# Patient Record
Sex: FEMALE | Race: ASIAN | NOT HISPANIC OR LATINO | URBAN - METROPOLITAN AREA
[De-identification: names, ages, dates, MRNs, and addresses within clinical notes are randomized per-mention and may not be internally consistent; named-entity substitution may affect disease eponyms.]

---

## 2022-06-19 ENCOUNTER — INPATIENT (INPATIENT)
Facility: HOSPITAL | Age: 47
LOS: 3 days | Discharge: HOME | End: 2022-06-23
Attending: INTERNAL MEDICINE | Admitting: INTERNAL MEDICINE
Payer: MEDICAID

## 2022-06-19 VITALS
WEIGHT: 169.98 LBS | SYSTOLIC BLOOD PRESSURE: 118 MMHG | OXYGEN SATURATION: 99 % | RESPIRATION RATE: 18 BRPM | HEART RATE: 92 BPM | TEMPERATURE: 97 F | DIASTOLIC BLOOD PRESSURE: 71 MMHG

## 2022-06-19 LAB
ALBUMIN SERPL ELPH-MCNC: 4.3 G/DL — SIGNIFICANT CHANGE UP (ref 3.5–5.2)
ALP SERPL-CCNC: 85 U/L — SIGNIFICANT CHANGE UP (ref 30–115)
ALT FLD-CCNC: 9 U/L — SIGNIFICANT CHANGE UP (ref 0–41)
ANION GAP SERPL CALC-SCNC: 13 MMOL/L — SIGNIFICANT CHANGE UP (ref 7–14)
APTT BLD: 31.9 SEC — SIGNIFICANT CHANGE UP (ref 27–39.2)
AST SERPL-CCNC: 16 U/L — SIGNIFICANT CHANGE UP (ref 0–41)
BASOPHILS # BLD AUTO: 0.03 K/UL — SIGNIFICANT CHANGE UP (ref 0–0.2)
BASOPHILS NFR BLD AUTO: 0.2 % — SIGNIFICANT CHANGE UP (ref 0–1)
BILIRUB SERPL-MCNC: 1.9 MG/DL — HIGH (ref 0.2–1.2)
BUN SERPL-MCNC: 7 MG/DL — LOW (ref 10–20)
CALCIUM SERPL-MCNC: 8.9 MG/DL — SIGNIFICANT CHANGE UP (ref 8.5–10.1)
CHLORIDE SERPL-SCNC: 99 MMOL/L — SIGNIFICANT CHANGE UP (ref 98–110)
CO2 SERPL-SCNC: 24 MMOL/L — SIGNIFICANT CHANGE UP (ref 17–32)
CREAT SERPL-MCNC: 0.7 MG/DL — SIGNIFICANT CHANGE UP (ref 0.7–1.5)
EGFR: 107 ML/MIN/1.73M2 — SIGNIFICANT CHANGE UP
EOSINOPHIL # BLD AUTO: 0 K/UL — SIGNIFICANT CHANGE UP (ref 0–0.7)
EOSINOPHIL NFR BLD AUTO: 0 % — SIGNIFICANT CHANGE UP (ref 0–8)
GLUCOSE SERPL-MCNC: 110 MG/DL — HIGH (ref 70–99)
HCT VFR BLD CALC: 34 % — LOW (ref 37–47)
HGB BLD-MCNC: 11.7 G/DL — LOW (ref 12–16)
IMM GRANULOCYTES NFR BLD AUTO: 0.3 % — SIGNIFICANT CHANGE UP (ref 0.1–0.3)
INR BLD: 1.18 RATIO — SIGNIFICANT CHANGE UP (ref 0.65–1.3)
LYMPHOCYTES # BLD AUTO: 1.4 K/UL — SIGNIFICANT CHANGE UP (ref 1.2–3.4)
LYMPHOCYTES # BLD AUTO: 9.5 % — LOW (ref 20.5–51.1)
MCHC RBC-ENTMCNC: 32.5 PG — HIGH (ref 27–31)
MCHC RBC-ENTMCNC: 34.4 G/DL — SIGNIFICANT CHANGE UP (ref 32–37)
MCV RBC AUTO: 94.4 FL — SIGNIFICANT CHANGE UP (ref 81–99)
MONOCYTES # BLD AUTO: 1.08 K/UL — HIGH (ref 0.1–0.6)
MONOCYTES NFR BLD AUTO: 7.4 % — SIGNIFICANT CHANGE UP (ref 1.7–9.3)
NEUTROPHILS # BLD AUTO: 12.13 K/UL — HIGH (ref 1.4–6.5)
NEUTROPHILS NFR BLD AUTO: 82.6 % — HIGH (ref 42.2–75.2)
NRBC # BLD: 0 /100 WBCS — SIGNIFICANT CHANGE UP (ref 0–0)
PLATELET # BLD AUTO: 204 K/UL — SIGNIFICANT CHANGE UP (ref 130–400)
POTASSIUM SERPL-MCNC: 4.1 MMOL/L — SIGNIFICANT CHANGE UP (ref 3.5–5)
POTASSIUM SERPL-SCNC: 4.1 MMOL/L — SIGNIFICANT CHANGE UP (ref 3.5–5)
PROT SERPL-MCNC: 7 G/DL — SIGNIFICANT CHANGE UP (ref 6–8)
PROTHROM AB SERPL-ACNC: 13.5 SEC — HIGH (ref 9.95–12.87)
RBC # BLD: 3.6 M/UL — LOW (ref 4.2–5.4)
RBC # FLD: 12.2 % — SIGNIFICANT CHANGE UP (ref 11.5–14.5)
SARS-COV-2 RNA SPEC QL NAA+PROBE: DETECTED
SODIUM SERPL-SCNC: 136 MMOL/L — SIGNIFICANT CHANGE UP (ref 135–146)
WBC # BLD: 14.69 K/UL — HIGH (ref 4.8–10.8)
WBC # FLD AUTO: 14.69 K/UL — HIGH (ref 4.8–10.8)

## 2022-06-19 PROCEDURE — 70491 CT SOFT TISSUE NECK W/DYE: CPT | Mod: 26,MA

## 2022-06-19 PROCEDURE — 99291 CRITICAL CARE FIRST HOUR: CPT

## 2022-06-19 PROCEDURE — 99222 1ST HOSP IP/OBS MODERATE 55: CPT | Mod: 25

## 2022-06-19 RX ORDER — DEXAMETHASONE 0.5 MG/5ML
10 ELIXIR ORAL ONCE
Refills: 0 | Status: COMPLETED | OUTPATIENT
Start: 2022-06-19 | End: 2022-06-19

## 2022-06-19 RX ORDER — AMPICILLIN SODIUM AND SULBACTAM SODIUM 250; 125 MG/ML; MG/ML
INJECTION, POWDER, FOR SUSPENSION INTRAMUSCULAR; INTRAVENOUS
Refills: 0 | Status: DISCONTINUED | OUTPATIENT
Start: 2022-06-19 | End: 2022-06-23

## 2022-06-19 RX ORDER — AMPICILLIN SODIUM AND SULBACTAM SODIUM 250; 125 MG/ML; MG/ML
3 INJECTION, POWDER, FOR SUSPENSION INTRAMUSCULAR; INTRAVENOUS EVERY 6 HOURS
Refills: 0 | Status: DISCONTINUED | OUTPATIENT
Start: 2022-06-19 | End: 2022-06-23

## 2022-06-19 RX ORDER — DEXAMETHASONE 0.5 MG/5ML
6 ELIXIR ORAL DAILY
Refills: 0 | Status: DISCONTINUED | OUTPATIENT
Start: 2022-06-19 | End: 2022-06-20

## 2022-06-19 RX ORDER — AMPICILLIN SODIUM AND SULBACTAM SODIUM 250; 125 MG/ML; MG/ML
3 INJECTION, POWDER, FOR SUSPENSION INTRAMUSCULAR; INTRAVENOUS ONCE
Refills: 0 | Status: COMPLETED | OUTPATIENT
Start: 2022-06-19 | End: 2022-06-19

## 2022-06-19 RX ORDER — SODIUM CHLORIDE 9 MG/ML
2400 INJECTION, SOLUTION INTRAVENOUS ONCE
Refills: 0 | Status: COMPLETED | OUTPATIENT
Start: 2022-06-19 | End: 2022-06-19

## 2022-06-19 RX ADMIN — SODIUM CHLORIDE 2400 MILLILITER(S): 9 INJECTION, SOLUTION INTRAVENOUS at 10:40

## 2022-06-19 RX ADMIN — Medication 10 MILLIGRAM(S): at 11:00

## 2022-06-19 RX ADMIN — AMPICILLIN SODIUM AND SULBACTAM SODIUM 200 GRAM(S): 250; 125 INJECTION, POWDER, FOR SUSPENSION INTRAMUSCULAR; INTRAVENOUS at 12:04

## 2022-06-19 RX ADMIN — Medication 102 MILLIGRAM(S): at 10:40

## 2022-06-19 RX ADMIN — AMPICILLIN SODIUM AND SULBACTAM SODIUM 200 GRAM(S): 250; 125 INJECTION, POWDER, FOR SUSPENSION INTRAMUSCULAR; INTRAVENOUS at 18:48

## 2022-06-19 RX ADMIN — SODIUM CHLORIDE 2400 MILLILITER(S): 9 INJECTION, SOLUTION INTRAVENOUS at 11:40

## 2022-06-19 NOTE — H&P ADULT - NSHPPHYSICALEXAM_GEN_ALL_CORE
Physical Examination:    General: No acute distress.  Alert, oriented, interactive, nonfocal    HEENT: Pupils equal, reactive to light.  Symmetric.    PULM: Clear to auscultation bilaterally, no significant sputum production    CVS: Regular rate and rhythm, no murmurs, rubs, or gallops    ABD: Soft, nondistended, nontender, normoactive bowel sounds, no masses    EXT: No edema, nontender    SKIN: Warm and well perfused, no rashes noted.

## 2022-06-19 NOTE — H&P ADULT - ASSESSMENT
IMPRESSION:  Odynophagia & dysphagia   ?Peritonsillar abscess  R submandibular lymphadenopathy    Mildly enlarged and edematous right submandibular gland.  COVID-19 PNA         PLAN:    CNS: No depressants     HEENT: Oral care, f/u ENT, c/w decadron, airway monitoring, low threshold for intubation, f/u S&S    PULMONARY: HOB elevated at 45 degrees aspiration precaution, Monitor pulse ox, keep SpO2 >92%, supplemental O2 as needed.     CARDIOVASCULAR: off pressors, continue IVF, add D5 if NPO. Monitor vitals     GI: GI prophylaxis.  Feeding as tolerated, f/u speech and swallow,     RENAL: Monitor electrolytes, correct as needed.     INFECTIOUS DISEASE: Trend WBC, c/w Unasyn, f/u C & S, f/u ENT    HEMATOLOGICAL:  DVT prophylaxis. Trend WBC    ENDOCRINE:  Follow up FS.  Insulin protocol if needed.    MUSCULOSKELETAL: Bed rest

## 2022-06-19 NOTE — CONSULT NOTE ADULT - NS ATTEND AMEND GEN_ALL_CORE FT
Patient seen and examined at bedside.    I reviewed, interpreted and discussed CT images. Peritonsillar abscess.  possible laryngeal asymmetry.    Continue abx. switch to clindamycin in 24h if no improvement.    I reviewed and interpreted videoendoscopic pictures. No airway compromise.    Will follow.

## 2022-06-19 NOTE — H&P ADULT - NSHPLABSRESULTS_GEN_ALL_CORE
ICU Vital Signs Last 24 Hrs  T(C): 36.1 (19 Jun 2022 08:28), Max: 36.1 (19 Jun 2022 08:28)  T(F): 97 (19 Jun 2022 08:28), Max: 97 (19 Jun 2022 08:28)  HR: 92 (19 Jun 2022 08:28) (92 - 92)  BP: 118/71 (19 Jun 2022 08:28) (118/71 - 118/71)  BP(mean): --  ABP: --  ABP(mean): --  RR: 18 (19 Jun 2022 08:28) (18 - 18)  SpO2: 99% (19 Jun 2022 08:28) (99% - 99%)        I&O's Detail        LABS:                        11.7   14.69 )-----------( 204      ( 19 Jun 2022 10:24 )             34.0     19 Jun 2022 10:24    136    |  99     |  7      ----------------------------<  110    4.1     |  24     |  0.7      Ca    8.9        19 Jun 2022 10:24    TPro  7.0    /  Alb  4.3    /  TBili  1.9    /  DBili  x      /  AST  16     /  ALT  9      /  AlkPhos  85     19 Jun 2022 10:24  Amylase x     lipase x              CAPILLARY BLOOD GLUCOSE        PT/INR - ( 19 Jun 2022 10:24 )   PT: 13.50 sec;   INR: 1.18 ratio         PTT - ( 19 Jun 2022 10:24 )  PTT:31.9 sec    Culture        MEDICATIONS  (STANDING):    MEDICATIONS  (PRN):        RADIOLOGY: ***     CXR:  TLC:  OG:  ET tube:          ECHO:

## 2022-06-19 NOTE — ED ADULT TRIAGE NOTE - CHIEF COMPLAINT QUOTE
Patient complaining of sore throat x4 days. Denies any difficulty swallowing, but pain. Speaking in full sentences in triage, no drooling.

## 2022-06-19 NOTE — ED PROVIDER NOTE - NS ED ROS FT
Constitutional: No fevers.   Eyes:  No visual changes, eye pain or discharge.  ENMT:  +odynophagia  Cardiac:  No chest pain, SOB or edema.   Respiratory:  No cough or respiratory distress. No hemoptysis. No history of asthma or RAD.  GI:  No nausea, vomiting, diarrhea or abdominal pain.  :  No dysuria, frequency or burning.  MS:  No myalgia, muscle weakness, joint pain or back pain.  Neuro:  No headache or weakness.  No LOC.  Skin:  No skin rash.   Endocrine: No history of thyroid disease or diabetes.

## 2022-06-19 NOTE — H&P ADULT - HISTORY OF PRESENT ILLNESS
47-year-old female with no past medical history presenting with sore throat for the last 4 days.  Patient states that she has had progressive odynophagia for the last 4 days that has now progressed to a hoarse voice, chills, edema to the right submandibular region.  Patient Dors is pain with range of motion of her neck.  Went to her primary care physician yesterday gave her azithromycin.  Is able to tolerate p.o.     47-year-old Mandarin speaking female with no past medical history presented with sore throat for the last 4 days.  Patient states that she has had progressive odynophagia and dysphagia with mild drooling for the last 4 days that has now progressed to a hoarse voice, chills, edema to the right submandibular region.  Patient also noticed pain with range of motion of her neck.  Went to her primary care physician yesterday gave her azithromycin.  Is able to tolerate p.o. denies fever, SOB.   On admission: afebrile, hemodynamically stable, saturating well on RA, COVID + (was unaware), WBC 14.69, Hg 11.7, CT neck showed: Right tonsillar enlargement with a peritonsillar fluid collection   measuring 0.7 x 0.6 cm. Asymmetric soft tissue thickening along the right oropharynx/hypopharynx  extending towards larynx with soft tissue mass within the right laryngeal  ventricle measuring approximately 2.3 x 1.2 cm. Recommend repeat  examination after appropriate antimicrobial treatment to document  resolution. If finding persists, recommend visual inspection for further  evaluation.

## 2022-06-19 NOTE — ED ADULT NURSE NOTE - SUICIDE SCREENING QUESTION 3
Patient repeatedly told RN that she would not want a tracheostomy or feeding tube and indicated that she would like the tube removed. RN provided education to patient that she was improving somewhat and asked if patient was uncomfortable; patient indicated that she was ok and that she could wait till Friday. Support offered to staff as well. Palliative care team to continue to follow; please contact with any concerns.     Goals/Plan of care  Education/support to staff  Education/support to family  Education/support to patient  Providing support for coping/adaptation/distress of family  Providing support for coping/adaptation/distress of patient  Decision making regarding life prolonging treatment    Electronically signed by   Malina Xie RN  Palliative Care Team  on 11/21/2018 at 4:10 PM No

## 2022-06-19 NOTE — ED PROVIDER NOTE - OBJECTIVE STATEMENT
47-year-old female with no past medical history presenting with sore throat for the last 4 days.  Patient states that she has had progressive odynophagia for the last 4 days that has now progressed to a hoarse voice, chills, edema to the right submandibular region.  Patient Dors is pain with range of motion of her neck.  Went to her primary care physician yesterday gave her azithromycin.  Is able to tolerate p.o.

## 2022-06-19 NOTE — ED PROVIDER NOTE - CLINICAL SUMMARY MEDICAL DECISION MAKING FREE TEXT BOX
patient with sore throat.   Noted to have PTA with right submandibular edema.   ENT consulted.  abx, decadron given.   admitted to ICU for airway monitoring.  Stable at this time- no indication for intubation at this time.

## 2022-06-19 NOTE — ED ADULT NURSE NOTE - LANGUAGE ASSISTANCE NEEDED
provided in urgi before transferred to main/No-Patient/Caregiver offered and refused free interpretation services.

## 2022-06-19 NOTE — ED PROVIDER NOTE - PHYSICAL EXAMINATION
CONSTITUTIONAL: Well-developed; well-nourished; in no acute distress. muffled voice.  SKIN: warm, dry.  HEAD: Normocephalic; atraumatic.  EYES: PERRL, EOMI, no conjunctival erythema  ENMT: Large right sided PTA. floor of mouth is soft, nontender, not raised.   NECK: Supple; non tender. right submandibular edema/induration.   CARD: S1, S2 normal; no murmurs, gallops, or rubs. Regular rate and rhythm.   RESP: No wheezes, rales or rhonchi.  ABD: soft ntnd.  EXT: Normal ROM.  No clubbing, cyanosis or edema.   NEURO: Alert, oriented, grossly unremarkable.  PSYCH: Cooperative, appropriate.

## 2022-06-20 LAB
ALBUMIN SERPL ELPH-MCNC: 3.8 G/DL — SIGNIFICANT CHANGE UP (ref 3.5–5.2)
ALP SERPL-CCNC: 77 U/L — SIGNIFICANT CHANGE UP (ref 30–115)
ALT FLD-CCNC: 9 U/L — SIGNIFICANT CHANGE UP (ref 0–41)
ANION GAP SERPL CALC-SCNC: 13 MMOL/L — SIGNIFICANT CHANGE UP (ref 7–14)
AST SERPL-CCNC: 11 U/L — SIGNIFICANT CHANGE UP (ref 0–41)
BILIRUB SERPL-MCNC: 1 MG/DL — SIGNIFICANT CHANGE UP (ref 0.2–1.2)
BUN SERPL-MCNC: 12 MG/DL — SIGNIFICANT CHANGE UP (ref 10–20)
CALCIUM SERPL-MCNC: 9 MG/DL — SIGNIFICANT CHANGE UP (ref 8.5–10.1)
CHLORIDE SERPL-SCNC: 100 MMOL/L — SIGNIFICANT CHANGE UP (ref 98–110)
CO2 SERPL-SCNC: 27 MMOL/L — SIGNIFICANT CHANGE UP (ref 17–32)
CREAT SERPL-MCNC: 0.6 MG/DL — LOW (ref 0.7–1.5)
EGFR: 111 ML/MIN/1.73M2 — SIGNIFICANT CHANGE UP
GLUCOSE SERPL-MCNC: 97 MG/DL — SIGNIFICANT CHANGE UP (ref 70–99)
HCT VFR BLD CALC: 35 % — LOW (ref 37–47)
HGB BLD-MCNC: 12 G/DL — SIGNIFICANT CHANGE UP (ref 12–16)
MAGNESIUM SERPL-MCNC: 2.1 MG/DL — SIGNIFICANT CHANGE UP (ref 1.8–2.4)
MCHC RBC-ENTMCNC: 32.9 PG — HIGH (ref 27–31)
MCHC RBC-ENTMCNC: 34.3 G/DL — SIGNIFICANT CHANGE UP (ref 32–37)
MCV RBC AUTO: 95.9 FL — SIGNIFICANT CHANGE UP (ref 81–99)
NRBC # BLD: 0 /100 WBCS — SIGNIFICANT CHANGE UP (ref 0–0)
PLATELET # BLD AUTO: 234 K/UL — SIGNIFICANT CHANGE UP (ref 130–400)
POTASSIUM SERPL-MCNC: 3.9 MMOL/L — SIGNIFICANT CHANGE UP (ref 3.5–5)
POTASSIUM SERPL-SCNC: 3.9 MMOL/L — SIGNIFICANT CHANGE UP (ref 3.5–5)
PROT SERPL-MCNC: 6.5 G/DL — SIGNIFICANT CHANGE UP (ref 6–8)
RBC # BLD: 3.65 M/UL — LOW (ref 4.2–5.4)
RBC # FLD: 12.3 % — SIGNIFICANT CHANGE UP (ref 11.5–14.5)
SODIUM SERPL-SCNC: 140 MMOL/L — SIGNIFICANT CHANGE UP (ref 135–146)
WBC # BLD: 15.46 K/UL — HIGH (ref 4.8–10.8)
WBC # FLD AUTO: 15.46 K/UL — HIGH (ref 4.8–10.8)

## 2022-06-20 PROCEDURE — 99222 1ST HOSP IP/OBS MODERATE 55: CPT

## 2022-06-20 PROCEDURE — 99024 POSTOP FOLLOW-UP VISIT: CPT

## 2022-06-20 PROCEDURE — 31575 DIAGNOSTIC LARYNGOSCOPY: CPT

## 2022-06-20 PROCEDURE — 71045 X-RAY EXAM CHEST 1 VIEW: CPT | Mod: 26

## 2022-06-20 RX ORDER — INFLUENZA VIRUS VACCINE 15; 15; 15; 15 UG/.5ML; UG/.5ML; UG/.5ML; UG/.5ML
0.5 SUSPENSION INTRAMUSCULAR ONCE
Refills: 0 | Status: DISCONTINUED | OUTPATIENT
Start: 2022-06-20 | End: 2022-06-23

## 2022-06-20 RX ADMIN — AMPICILLIN SODIUM AND SULBACTAM SODIUM 200 GRAM(S): 250; 125 INJECTION, POWDER, FOR SUSPENSION INTRAMUSCULAR; INTRAVENOUS at 20:54

## 2022-06-20 RX ADMIN — AMPICILLIN SODIUM AND SULBACTAM SODIUM 200 GRAM(S): 250; 125 INJECTION, POWDER, FOR SUSPENSION INTRAMUSCULAR; INTRAVENOUS at 16:11

## 2022-06-20 RX ADMIN — Medication 6 MILLIGRAM(S): at 06:27

## 2022-06-20 RX ADMIN — AMPICILLIN SODIUM AND SULBACTAM SODIUM 200 GRAM(S): 250; 125 INJECTION, POWDER, FOR SUSPENSION INTRAMUSCULAR; INTRAVENOUS at 03:06

## 2022-06-20 RX ADMIN — AMPICILLIN SODIUM AND SULBACTAM SODIUM 200 GRAM(S): 250; 125 INJECTION, POWDER, FOR SUSPENSION INTRAMUSCULAR; INTRAVENOUS at 10:45

## 2022-06-20 NOTE — PATIENT PROFILE ADULT - FALL HARM RISK - PATIENT NEEDS ASSISTANCE
I just finished contacting the patient (sean) she said she can not do video it arun not work for her. she does not want to cancel appointment. She said she wants to see doshamarl she was screaming on the phone. She is not feeling well and cant breath well. So she said she was not going to cancel. Tried to reschedule but does not want to cancel please call the patient.   Standing/Walking/Toileting

## 2022-06-20 NOTE — PROGRESS NOTE ADULT - SUBJECTIVE AND OBJECTIVE BOX
ENT DAILY PROGRESS NOTE    Pt is a 47y Female      REVIEW OF SYSTEMS   [x] A ten-point review of systems was otherwise negative except as noted.  [ ] Due to altered mental status/intubation, subjective information were not able to be obtained from patient. History was obtained, to the extent possible, from review of the chart and collateral sources of information.    Allergies    No Known Allergies    Intolerances        MEDICATIONS:  ampicillin/sulbactam  IVPB      ampicillin/sulbactam  IVPB 3 Gram(s) IV Intermittent every 6 hours  influenza   Vaccine 0.5 milliLiter(s) IntraMuscular once      Vital Signs Last 24 Hrs  T(C): 35.9 (20 Jun 2022 13:33), Max: 36.9 (20 Jun 2022 06:30)  T(F): 96.6 (20 Jun 2022 13:33), Max: 98.4 (20 Jun 2022 06:30)  HR: 64 (20 Jun 2022 13:33) (64 - 87)  BP: 131/75 (20 Jun 2022 13:33) (111/56 - 131/75)  BP(mean): --  RR: 18 (20 Jun 2022 13:33) (16 - 18)  SpO2: 100% (20 Jun 2022 13:33) (96% - 100%)        PHYSICAL EXAM:    GEN: Well-developed, well-nourished. NAD, awake and alert. No drooling or pooling of secretions. No stridor or stertor. Good vocal quality, no hoarseness.   SKIN: Good color, non diaphoretic  HEENT: NC/AT; Oral mucosa pink and moist. No erythema or edema noted to buccal mucosa, tongue, FOM, uvula or posterior oropharynx. Uvula midline  NECK:  Trachea midline. Neck supple, no TTP to B/L lateral neck, no cervical LAD.  RESP: No dyspnea, non-labored breathing. No use of accessory muscles.  CARDIO: +S1/S2  ABDO: Soft, NT.  EXT: STAPLETON x 4    LABS:  CBC-                        12.0   15.46 )-----------( 234      ( 20 Jun 2022 07:21 )             35.0     BMP/CMP-  20 Jun 2022 07:21    140    |  100    |  12     ----------------------------<  97     3.9     |  27     |  0.6      Ca    9.0        20 Jun 2022 07:21  Mg     2.1       20 Jun 2022 07:21    TPro  6.5    /  Alb  3.8    /  TBili  1.0    /  DBili  x      /  AST  11     /  ALT  9      /  AlkPhos  77     20 Jun 2022 07:21    Coagulation Studies-  PT/INR - ( 19 Jun 2022 10:24 )   PT: 13.50 sec;   INR: 1.18 ratio         PTT - ( 19 Jun 2022 10:24 )  PTT:31.9 sec  Endocrine Panel-  Calcium, Total Serum: 9.0 mg/dL (06-20 @ 07:21)              RADIOLOGY & ADDITIONAL STUDIES:   ENT DAILY PROGRESS NOTE    Pt is a 47y Female Mandarin speaking with no significant PMH a/w Right PTA s/p bedside FNA by ENT. Patient seen and examined at bedside. Patient reports mild improvement of sore throat. Patient reports dyphagia to PO solids. Denies any fever, chills, SOB/difficulty breathing, decrease tolerance of own secretions, odynphagia. Denies any metallic taste in her mouth. No acute overnight events.       REVIEW OF SYSTEMS   [x] A ten-point review of systems was otherwise negative except as noted.    Allergies    No Known Allergies    Intolerances        MEDICATIONS:  ampicillin/sulbactam  IVPB      ampicillin/sulbactam  IVPB 3 Gram(s) IV Intermittent every 6 hours  influenza   Vaccine 0.5 milliLiter(s) IntraMuscular once      Vital Signs Last 24 Hrs  T(C): 35.9 (20 Jun 2022 13:33), Max: 36.9 (20 Jun 2022 06:30)  T(F): 96.6 (20 Jun 2022 13:33), Max: 98.4 (20 Jun 2022 06:30)  HR: 64 (20 Jun 2022 13:33) (64 - 87)  BP: 131/75 (20 Jun 2022 13:33) (111/56 - 131/75)  RR: 18 (20 Jun 2022 13:33) (16 - 18)  SpO2: 100% (20 Jun 2022 13:33) (96% - 100%)        PHYSICAL EXAM:    GEN: Well-developed, well-nourished. NAD, awake and alert. No drooling or pooling of secretions. No stridor or stertor. Good vocal quality, no hoarseness.   SKIN: Good color, non diaphoretic  HEENT: NC/AT; Oral mucosa pink and moist. No erythema or edema noted to buccal mucosa, tongue, FOM, uvula. Uvula midline. + edematous / erythematous RIGHT peritonsillar region, no evidence of purulent drainage or active bleeding, +TTP   NECK:  Trachea midline. Neck supple, no TTP to B/L lateral neck, no cervical LAD.  RESP: No dyspnea, non-labored breathing. No use of accessory muscles.  CARDIO: +S1/S2  ABDO: Soft, NT.  EXT: STAPLETON x 4    LABS:  CBC-                        12.0   15.46 )-----------( 234      ( 20 Jun 2022 07:21 )             35.0     BMP/CMP-  20 Jun 2022 07:21    140    |  100    |  12     ----------------------------<  97     3.9     |  27     |  0.6      Ca    9.0        20 Jun 2022 07:21  Mg     2.1       20 Jun 2022 07:21    TPro  6.5    /  Alb  3.8    /  TBili  1.0    /  DBili  x      /  AST  11     /  ALT  9      /  AlkPhos  77     20 Jun 2022 07:21    Coagulation Studies-  PT/INR - ( 19 Jun 2022 10:24 )   PT: 13.50 sec;   INR: 1.18 ratio         PTT - ( 19 Jun 2022 10:24 )  PTT:31.9 sec  Endocrine Panel-  Calcium, Total Serum: 9.0 mg/dL (06-20 @ 07:21)              RADIOLOGY & ADDITIONAL STUDIES:

## 2022-06-20 NOTE — PATIENT PROFILE ADULT - FALL HARM RISK - HARM RISK INTERVENTIONS

## 2022-06-20 NOTE — SWALLOW BEDSIDE ASSESSMENT ADULT - SLP PERTINENT HISTORY OF CURRENT PROBLEM
47-year-old Mandarin speaking female with no past medical history presented with sore throat for the last 4 days.  Patient states that she has had progressive odynophagia and dysphagia with mild drooling for the last 4 days that has now progressed to a hoarse voice, chills, edema to the right submandibular region.  Patient also noticed pain with range of motion of her neck.  Went to her primary care physician yesterday gave her azithromycin.  Is able to tolerate p.o. denies fever, SOB.

## 2022-06-21 LAB
ALBUMIN SERPL ELPH-MCNC: 3.7 G/DL — SIGNIFICANT CHANGE UP (ref 3.5–5.2)
ALP SERPL-CCNC: 81 U/L — SIGNIFICANT CHANGE UP (ref 30–115)
ALT FLD-CCNC: 13 U/L — SIGNIFICANT CHANGE UP (ref 0–41)
ANION GAP SERPL CALC-SCNC: 12 MMOL/L — SIGNIFICANT CHANGE UP (ref 7–14)
AST SERPL-CCNC: 23 U/L — SIGNIFICANT CHANGE UP (ref 0–41)
BASOPHILS # BLD AUTO: 0.02 K/UL — SIGNIFICANT CHANGE UP (ref 0–0.2)
BASOPHILS NFR BLD AUTO: 0.1 % — SIGNIFICANT CHANGE UP (ref 0–1)
BILIRUB SERPL-MCNC: 1.3 MG/DL — HIGH (ref 0.2–1.2)
BUN SERPL-MCNC: 9 MG/DL — LOW (ref 10–20)
CALCIUM SERPL-MCNC: 8.7 MG/DL — SIGNIFICANT CHANGE UP (ref 8.5–10.1)
CHLORIDE SERPL-SCNC: 100 MMOL/L — SIGNIFICANT CHANGE UP (ref 98–110)
CO2 SERPL-SCNC: 25 MMOL/L — SIGNIFICANT CHANGE UP (ref 17–32)
CREAT SERPL-MCNC: 0.6 MG/DL — LOW (ref 0.7–1.5)
EGFR: 111 ML/MIN/1.73M2 — SIGNIFICANT CHANGE UP
EOSINOPHIL # BLD AUTO: 0.01 K/UL — SIGNIFICANT CHANGE UP (ref 0–0.7)
EOSINOPHIL NFR BLD AUTO: 0.1 % — SIGNIFICANT CHANGE UP (ref 0–8)
GLUCOSE SERPL-MCNC: 99 MG/DL — SIGNIFICANT CHANGE UP (ref 70–99)
HCT VFR BLD CALC: 35.3 % — LOW (ref 37–47)
HGB BLD-MCNC: 12.1 G/DL — SIGNIFICANT CHANGE UP (ref 12–16)
IMM GRANULOCYTES NFR BLD AUTO: 0.6 % — HIGH (ref 0.1–0.3)
LYMPHOCYTES # BLD AUTO: 1.57 K/UL — SIGNIFICANT CHANGE UP (ref 1.2–3.4)
LYMPHOCYTES # BLD AUTO: 11.4 % — LOW (ref 20.5–51.1)
MAGNESIUM SERPL-MCNC: 2.1 MG/DL — SIGNIFICANT CHANGE UP (ref 1.8–2.4)
MCHC RBC-ENTMCNC: 32.6 PG — HIGH (ref 27–31)
MCHC RBC-ENTMCNC: 34.3 G/DL — SIGNIFICANT CHANGE UP (ref 32–37)
MCV RBC AUTO: 95.1 FL — SIGNIFICANT CHANGE UP (ref 81–99)
MONOCYTES # BLD AUTO: 0.98 K/UL — HIGH (ref 0.1–0.6)
MONOCYTES NFR BLD AUTO: 7.1 % — SIGNIFICANT CHANGE UP (ref 1.7–9.3)
MRSA PCR RESULT.: NEGATIVE — SIGNIFICANT CHANGE UP
NEUTROPHILS # BLD AUTO: 11.11 K/UL — HIGH (ref 1.4–6.5)
NEUTROPHILS NFR BLD AUTO: 80.7 % — HIGH (ref 42.2–75.2)
NRBC # BLD: 0 /100 WBCS — SIGNIFICANT CHANGE UP (ref 0–0)
PHOSPHATE SERPL-MCNC: 3.3 MG/DL — SIGNIFICANT CHANGE UP (ref 2.1–4.9)
PLATELET # BLD AUTO: 253 K/UL — SIGNIFICANT CHANGE UP (ref 130–400)
POTASSIUM SERPL-MCNC: 4.1 MMOL/L — SIGNIFICANT CHANGE UP (ref 3.5–5)
POTASSIUM SERPL-SCNC: 4.1 MMOL/L — SIGNIFICANT CHANGE UP (ref 3.5–5)
PROT SERPL-MCNC: 6.5 G/DL — SIGNIFICANT CHANGE UP (ref 6–8)
RBC # BLD: 3.71 M/UL — LOW (ref 4.2–5.4)
RBC # FLD: 12.4 % — SIGNIFICANT CHANGE UP (ref 11.5–14.5)
SODIUM SERPL-SCNC: 137 MMOL/L — SIGNIFICANT CHANGE UP (ref 135–146)
WBC # BLD: 13.77 K/UL — HIGH (ref 4.8–10.8)
WBC # FLD AUTO: 13.77 K/UL — HIGH (ref 4.8–10.8)

## 2022-06-21 PROCEDURE — 99233 SBSQ HOSP IP/OBS HIGH 50: CPT | Mod: 25

## 2022-06-21 PROCEDURE — 42700 I&D ABSCESS PERITONSILLAR: CPT

## 2022-06-21 PROCEDURE — 99233 SBSQ HOSP IP/OBS HIGH 50: CPT

## 2022-06-21 PROCEDURE — 10160 PNXR ASPIR ABSC HMTMA BULLA: CPT

## 2022-06-21 RX ORDER — BENZOCAINE 10 %
1 GEL (GRAM) MUCOUS MEMBRANE ONCE
Refills: 0 | Status: COMPLETED | OUTPATIENT
Start: 2022-06-21 | End: 2022-06-21

## 2022-06-21 RX ORDER — ENOXAPARIN SODIUM 100 MG/ML
40 INJECTION SUBCUTANEOUS ONCE
Refills: 0 | Status: COMPLETED | OUTPATIENT
Start: 2022-06-21 | End: 2022-06-21

## 2022-06-21 RX ORDER — ACETAMINOPHEN 500 MG
775 TABLET ORAL EVERY 6 HOURS
Refills: 0 | Status: DISCONTINUED | OUTPATIENT
Start: 2022-06-21 | End: 2022-06-23

## 2022-06-21 RX ORDER — LANOLIN ALCOHOL/MO/W.PET/CERES
5 CREAM (GRAM) TOPICAL AT BEDTIME
Refills: 0 | Status: DISCONTINUED | OUTPATIENT
Start: 2022-06-21 | End: 2022-06-23

## 2022-06-21 RX ORDER — DEXAMETHASONE 0.5 MG/5ML
6 ELIXIR ORAL DAILY
Refills: 0 | Status: DISCONTINUED | OUTPATIENT
Start: 2022-06-21 | End: 2022-06-23

## 2022-06-21 RX ORDER — OXYCODONE AND ACETAMINOPHEN 5; 325 MG/1; MG/1
1 TABLET ORAL ONCE
Refills: 0 | Status: DISCONTINUED | OUTPATIENT
Start: 2022-06-21 | End: 2022-06-23

## 2022-06-21 RX ORDER — DEXAMETHASONE 0.5 MG/5ML
6 ELIXIR ORAL ONCE
Refills: 0 | Status: COMPLETED | OUTPATIENT
Start: 2022-06-21 | End: 2022-06-21

## 2022-06-21 RX ORDER — PANTOPRAZOLE SODIUM 20 MG/1
40 TABLET, DELAYED RELEASE ORAL
Refills: 0 | Status: DISCONTINUED | OUTPATIENT
Start: 2022-06-21 | End: 2022-06-23

## 2022-06-21 RX ORDER — VANCOMYCIN HCL 1 G
1000 VIAL (EA) INTRAVENOUS ONCE
Refills: 0 | Status: COMPLETED | OUTPATIENT
Start: 2022-06-21 | End: 2022-06-21

## 2022-06-21 RX ADMIN — PANTOPRAZOLE SODIUM 40 MILLIGRAM(S): 20 TABLET, DELAYED RELEASE ORAL at 09:08

## 2022-06-21 RX ADMIN — AMPICILLIN SODIUM AND SULBACTAM SODIUM 200 GRAM(S): 250; 125 INJECTION, POWDER, FOR SUSPENSION INTRAMUSCULAR; INTRAVENOUS at 23:47

## 2022-06-21 RX ADMIN — AMPICILLIN SODIUM AND SULBACTAM SODIUM 200 GRAM(S): 250; 125 INJECTION, POWDER, FOR SUSPENSION INTRAMUSCULAR; INTRAVENOUS at 00:16

## 2022-06-21 RX ADMIN — Medication 6 MILLIGRAM(S): at 11:05

## 2022-06-21 RX ADMIN — Medication 5 MILLIGRAM(S): at 21:29

## 2022-06-21 RX ADMIN — AMPICILLIN SODIUM AND SULBACTAM SODIUM 200 GRAM(S): 250; 125 INJECTION, POWDER, FOR SUSPENSION INTRAMUSCULAR; INTRAVENOUS at 17:04

## 2022-06-21 RX ADMIN — Medication 250 MILLIGRAM(S): at 15:51

## 2022-06-21 RX ADMIN — Medication 775 MILLIGRAM(S): at 03:10

## 2022-06-21 RX ADMIN — Medication 1 SPRAY(S): at 11:06

## 2022-06-21 RX ADMIN — Medication 6 MILLIGRAM(S): at 15:51

## 2022-06-21 RX ADMIN — AMPICILLIN SODIUM AND SULBACTAM SODIUM 200 GRAM(S): 250; 125 INJECTION, POWDER, FOR SUSPENSION INTRAMUSCULAR; INTRAVENOUS at 11:05

## 2022-06-21 RX ADMIN — AMPICILLIN SODIUM AND SULBACTAM SODIUM 200 GRAM(S): 250; 125 INJECTION, POWDER, FOR SUSPENSION INTRAMUSCULAR; INTRAVENOUS at 06:27

## 2022-06-21 RX ADMIN — ENOXAPARIN SODIUM 40 MILLIGRAM(S): 100 INJECTION SUBCUTANEOUS at 09:08

## 2022-06-21 RX ADMIN — Medication 775 MILLIGRAM(S): at 03:40

## 2022-06-21 NOTE — PROGRESS NOTE ADULT - SUBJECTIVE AND OBJECTIVE BOX
ENT DAILY PROGRESS NOTE    Pt is a 47y Female a/w right PTA - seen and examined at bedside. Pt c/o worsening symptoms this AM - pain & swelling in mouth, increased neck pain, breathing okay and tolerating some liquid PO.       REVIEW OF SYSTEMS   [x] A ten-point review of systems was otherwise negative except as noted.    Allergies    No Known Allergies    Intolerances      MEDICATIONS:  acetaminophen    Suspension .. 775 milliGRAM(s) Oral every 6 hours PRN  ampicillin/sulbactam  IVPB      ampicillin/sulbactam  IVPB 3 Gram(s) IV Intermittent every 6 hours  benzocaine 20% Spray 1 Spray(s) Topical once  dexAMETHasone  Injectable 6 milliGRAM(s) IV Push daily  influenza   Vaccine 0.5 milliLiter(s) IntraMuscular once  melatonin 5 milliGRAM(s) Oral at bedtime  oxycodone    5 mG/acetaminophen 325 mG 1 Tablet(s) Oral once PRN  pantoprazole    Tablet 40 milliGRAM(s) Oral before breakfast      Vital Signs Last 24 Hrs  T(C): 37.1 (21 Jun 2022 05:34), Max: 37.1 (20 Jun 2022 20:32)  T(F): 98.8 (21 Jun 2022 05:34), Max: 98.8 (21 Jun 2022 05:34)  HR: 78 (21 Jun 2022 05:34) (64 - 85)  BP: 92/51 (21 Jun 2022 05:34) (92/51 - 131/75)  RR: 18 (21 Jun 2022 05:34) (18 - 18)  SpO2: 97% (21 Jun 2022 05:34) (96% - 100%)      PHYSICAL EXAM:    GEN: NAD, awake and alert. No drooling or pooling of secretions. No stridor or stertor. Muffled vocal quality.  SKIN: Good color, non diaphoretic  HEENT: mild trismus. Oral mucosa pink and moist. + edema to the right peritonsillar space, extending to the hard palate. + left tonsillar edema noted. ++ uvular deviation to the right. No erythema or edema noted to buccal mucosa, tongue, FOM.  NECK:  Trachea midline. + TTP to left lateral neck, no palpable LAD, no fluctuance. FROM of neck.  RESP: No dyspnea, non-labored breathing. No use of accessory muscles.  CARDIO: +S1/S2  ABDO: Soft, NT.  EXT: STAPLETON x 4    LABS:  CBC-                        12.1   13.77 )-----------( 253      ( 21 Jun 2022 07:38 )             35.3     BMP/CMP-  21 Jun 2022 07:38    137    |  100    |  9      ----------------------------<  99     4.1     |  25     |  0.6      Ca    8.7        21 Jun 2022 07:38  Phos  3.3       21 Jun 2022 07:38  Mg     2.1       21 Jun 2022 07:38    TPro  6.5    /  Alb  3.7    /  TBili  1.3    /  DBili  x      /  AST  23     /  ALT  13     /  AlkPhos  81     21 Jun 2022 07:38    Coagulation Studies-    Endocrine Panel-  Calcium, Total Serum: 8.7 mg/dL (06-21 @ 07:38)

## 2022-06-21 NOTE — PROGRESS NOTE ADULT - SUBJECTIVE AND OBJECTIVE BOX
SUBJECTIVE:  HPI:  47-year-old Mandarin speaking female with no past medical history presented with sore throat for the last 4 days.  Patient states that she has had progressive odynophagia and dysphagia with mild drooling for the last 4 days that has now progressed to a hoarse voice, chills, edema to the right submandibular region.  Patient also noticed pain with range of motion of her neck.  Went to her primary care physician yesterday gave her azithromycin.  Is able to tolerate p.o. denies fever, SOB.   On admission: afebrile, hemodynamically stable, saturating well on RA, COVID + (was unaware), WBC 14.69, Hg 11.7, CT neck showed: Right tonsillar enlargement with a peritonsillar fluid collection   measuring 0.7 x 0.6 cm. Asymmetric soft tissue thickening along the right oropharynx/hypopharynx  extending towards larynx with soft tissue mass within the right laryngeal  ventricle measuring approximately 2.3 x 1.2 cm. Recommend repeat  examination after appropriate antimicrobial treatment to document  resolution. If finding persists, recommend visual inspection for further  evaluation.             (19 Jun 2022 13:29)      Patient is a 47y old Female who presents with a chief complaint of Right PTA, Laryngeal edema (19 Jun 2022 18:24)    Currently admitted to medicine with the primary diagnosis of Peritonsillar abscess       Today is hospital day 2d.     PAST MEDICAL & SURGICAL HISTORY      ALLERGIES:  No Known Allergies    MEDICATIONS:  ACTIVE MEDICATIONS  acetaminophen    Suspension .. 775 milliGRAM(s) Oral every 6 hours PRN  ampicillin/sulbactam  IVPB      ampicillin/sulbactam  IVPB 3 Gram(s) IV Intermittent every 6 hours  influenza   Vaccine 0.5 milliLiter(s) IntraMuscular once  oxycodone    5 mG/acetaminophen 325 mG 1 Tablet(s) Oral once PRN      VITALS:   T(F): 98.8  HR: 78  BP: 92/51  RR: 18  SpO2: 97%    LABS:                        12.0   15.46 )-----------( 234      ( 20 Jun 2022 07:21 )             35.0     06-20    140  |  100  |  12  ----------------------------<  97  3.9   |  27  |  0.6<L>    Ca    9.0      20 Jun 2022 07:21  Mg     2.1     06-20    TPro  6.5  /  Alb  3.8  /  TBili  1.0  /  DBili  x   /  AST  11  /  ALT  9   /  AlkPhos  77  06-20    PT/INR - ( 19 Jun 2022 10:24 )   PT: 13.50 sec;   INR: 1.18 ratio         PTT - ( 19 Jun 2022 10:24 )  PTT:31.9 sec          Culture - Blood (collected 19 Jun 2022 10:24)  Source: .Blood Blood-Peripheral  Preliminary Report (20 Jun 2022 19:01):    No growth to date.    Culture - Blood (collected 19 Jun 2022 10:24)  Source: .Blood Blood-Peripheral  Preliminary Report (20 Jun 2022 19:01):    No growth to date.              PHYSICAL EXAM:  GEN: Well-developed, well-nourished. NAD, awake and alert. No drooling or pooling of secretions. No stridor or stertor. Good vocal quality, no hoarseness.   SKIN: Good color, non diaphoretic  HEENT: NC/AT; Oral mucosa pink and moist. No erythema or edema noted to buccal mucosa, tongue, FOM, uvula. Uvula midline. + edematous / erythematous RIGHT peritonsillar region, no evidence of purulent drainage or active bleeding, +TTP   NECK:  Trachea midline. Neck supple, no TTP to B/L lateral neck, no cervical LAD.  RESP: No dyspnea, non-labored breathing. No use of accessory muscles.  CARDIO: +S1/S2  ABDO: Soft, NT.  EXT: STAPLETON x 4        A/P:    Admitted in ICU because of airway concer, speech and swallow was consulted and she was cleared for PO diet. ENT on board, recs to c/w dexamethasone.    Now downgraded as there is no airway concern.      IMPRESSION:  Peritonsillar abscess SP needle aspiration  NO clinical nor radiological evidence of airway compromise   COVID infection   SUBJECTIVE:  HPI:  47-year-old Mandarin speaking female with no past medical history presented with sore throat for the last 4 days.  Patient states that she has had progressive odynophagia and dysphagia with mild drooling for the last 4 days that has now progressed to a hoarse voice, chills, edema to the right submandibular region.  Patient also noticed pain with range of motion of her neck.  Went to her primary care physician yesterday gave her azithromycin.  Is able to tolerate p.o. denies fever, SOB.   On admission: afebrile, hemodynamically stable, saturating well on RA, COVID + (was unaware), WBC 14.69, Hg 11.7, CT neck showed: Right tonsillar enlargement with a peritonsillar fluid collection   measuring 0.7 x 0.6 cm. Asymmetric soft tissue thickening along the right oropharynx/hypopharynx  extending towards larynx with soft tissue mass within the right laryngeal  ventricle measuring approximately 2.3 x 1.2 cm. Recommend repeat  examination after appropriate antimicrobial treatment to document  resolution. If finding persists, recommend visual inspection for further  evaluation.             (19 Jun 2022 13:29)        PAST MEDICAL & SURGICAL HISTORY- NAD      ALLERGIES:  No Known Allergies    MEDICATIONS:  ACTIVE MEDICATIONS  acetaminophen    Suspension .. 775 milliGRAM(s) Oral every 6 hours PRN  ampicillin/sulbactam  IVPB      ampicillin/sulbactam  IVPB 3 Gram(s) IV Intermittent every 6 hours  influenza   Vaccine 0.5 milliLiter(s) IntraMuscular once  oxycodone    5 mG/acetaminophen 325 mG 1 Tablet(s) Oral once PRN      VITALS:   T(F): 98.8  HR: 78  BP: 92/51  RR: 18  SpO2: 97%    LABS:                        12.0   15.46 )-----------( 234      ( 20 Jun 2022 07:21 )             35.0     06-20    140  |  100  |  12  ----------------------------<  97  3.9   |  27  |  0.6<L>    Ca    9.0      20 Jun 2022 07:21  Mg     2.1     06-20    TPro  6.5  /  Alb  3.8  /  TBili  1.0  /  DBili  x   /  AST  11  /  ALT  9   /  AlkPhos  77  06-20    PT/INR - ( 19 Jun 2022 10:24 )   PT: 13.50 sec;   INR: 1.18 ratio         PTT - ( 19 Jun 2022 10:24 )  PTT:31.9 sec          Culture - Blood (collected 19 Jun 2022 10:24)  Source: .Blood Blood-Peripheral  Preliminary Report (20 Jun 2022 19:01):    No growth to date.    Culture - Blood (collected 19 Jun 2022 10:24)  Source: .Blood Blood-Peripheral  Preliminary Report (20 Jun 2022 19:01):    No growth to date.              PHYSICAL EXAM:  GEN: Well-developed, well-nourished. NAD, awake and alert. No drooling or pooling of secretions. No stridor or stertor. Good vocal quality, no hoarseness.   SKIN: Good color, non diaphoretic  HEENT: NC/AT; Oral mucosa pink and moist. No erythema or edema noted to buccal mucosa, tongue, FOM, uvula. Uvula midline. + edematous / erythematous RIGHT peritonsillar region, no evidence of purulent drainage or active bleeding, +TTP   NECK:  Trachea midline. Neck supple, no TTP to B/L lateral neck, no cervical LAD.  RESP: No dyspnea, non-labored breathing. No use of accessory muscles.  CARDIO: +S1/S2  ABDO: Soft, NT.  EXT: STAPLETON x 4        A/P:  Patient is a 47y old Female who presents with a chief complaint of Right PTA, Laryngeal edema (19 Jun 2022 18:24)  Admitted in ICU because of airway concern, speech and swallow was consulted and she was cleared for PO diet. ENT on board, recs to c/w dexamethasone.  Now downgraded as there is no airway concern. Currently admitted to medicine with the primary diagnosis of Peritonsillar abscess  Today is hospital day 2d.       #Peritonsillar abscess SP needle aspiration  NO clinical nor radiological evidence of airway compromise   #COVID 19 positive    * 06/21:    Pt c/o worsening symptoms this AM - pain & swelling in mouth, increased neck pain, breathing okay and tolerating some liquid PO.   - concern of recurrent abcess  - pain control prn  -cont soft diet/full liquid as tolerated  - Decadron 6mg started, double the dose at 4pm  - bedside needle aspiration performed- specimen sent for cultures and will reconsider ABx based on that.  - PPx Vancomycin started for MRSA coverage, MRSA nares came negative  - F/u ID     SUBJECTIVE:  HPI:  47-year-old Mandarin speaking female with no past medical history presented with sore throat for the last 4 days.  Patient states that she has had progressive odynophagia and dysphagia with mild drooling for the last 4 days that has now progressed to a hoarse voice, chills, edema to the right submandibular region.  Patient also noticed pain with range of motion of her neck.  Went to her primary care physician yesterday gave her azithromycin.  Is able to tolerate p.o. denies fever, SOB.   On admission: afebrile, hemodynamically stable, saturating well on RA, COVID + (was unaware), WBC 14.69, Hg 11.7, CT neck showed: Right tonsillar enlargement with a peritonsillar fluid collection   measuring 0.7 x 0.6 cm. Asymmetric soft tissue thickening along the right oropharynx/hypopharynx  extending towards larynx with soft tissue mass within the right laryngeal  ventricle measuring approximately 2.3 x 1.2 cm. Recommend repeat  examination after appropriate antimicrobial treatment to document  resolution. If finding persists, recommend visual inspection for further  evaluation.             (19 Jun 2022 13:29)        PAST MEDICAL & SURGICAL HISTORY- NAD      ALLERGIES:  No Known Allergies    MEDICATIONS:  ACTIVE MEDICATIONS  acetaminophen    Suspension .. 775 milliGRAM(s) Oral every 6 hours PRN  ampicillin/sulbactam  IVPB      ampicillin/sulbactam  IVPB 3 Gram(s) IV Intermittent every 6 hours  influenza   Vaccine 0.5 milliLiter(s) IntraMuscular once  oxycodone    5 mG/acetaminophen 325 mG 1 Tablet(s) Oral once PRN      VITALS:   T(F): 98.8  HR: 78  BP: 92/51  RR: 18  SpO2: 97%    LABS:                        12.0   15.46 )-----------( 234      ( 20 Jun 2022 07:21 )             35.0     06-20    140  |  100  |  12  ----------------------------<  97  3.9   |  27  |  0.6<L>    Ca    9.0      20 Jun 2022 07:21  Mg     2.1     06-20    TPro  6.5  /  Alb  3.8  /  TBili  1.0  /  DBili  x   /  AST  11  /  ALT  9   /  AlkPhos  77  06-20    PT/INR - ( 19 Jun 2022 10:24 )   PT: 13.50 sec;   INR: 1.18 ratio         PTT - ( 19 Jun 2022 10:24 )  PTT:31.9 sec          Culture - Blood (collected 19 Jun 2022 10:24)  Source: .Blood Blood-Peripheral  Preliminary Report (20 Jun 2022 19:01):    No growth to date.    Culture - Blood (collected 19 Jun 2022 10:24)  Source: .Blood Blood-Peripheral  Preliminary Report (20 Jun 2022 19:01):    No growth to date.              PHYSICAL EXAM:    GEN: NAD, awake and alert. No drooling or pooling of secretions. No stridor or stertor. Muffled vocal quality.  SKIN: Good color, non diaphoretic  HEENT: mild trismus. Oral mucosa pink and moist. + edema to the right peritonsillar space, extending to the hard palate. + left tonsillar edema noted. ++ uvular deviation to the right. No erythema or edema noted to buccal mucosa, tongue, FOM.  NECK:  Trachea midline. + TTP to left lateral neck, no palpable LAD, no fluctuance. FROM of neck.  RESP: No dyspnea, non-labored breathing. No use of accessory muscles.  CARDIO: +S1/S2  ABDO: Soft, NT.  EXT: STAPLETON x 4      A/P:  Patient is a 47y old Female who presents with a chief complaint of Right PTA, Laryngeal edema (19 Jun 2022 18:24)  Admitted in ICU because of airway concern, speech and swallow was consulted and she was cleared for PO diet. ENT on board, recs to c/w dexamethasone.  Now downgraded as there is no airway concern. Currently admitted to medicine with the primary diagnosis of Peritonsillar abscess.      #Peritonsillar abscess SP needle aspiration  NO clinical nor radiological evidence of airway compromise   #COVID 19 positive    * 06/21:  Pt c/o worsening symptoms this AM - pain & swelling in mouth, increased neck pain, breathing okay and tolerating some liquid PO.   - concern of recurrent abcess  - pain control prn  -cont soft diet/full liquid as tolerated  - Decadron 6mg started, double the dose at 4pm  - bedside needle aspiration performed- specimen sent for cultures and will reconsider ABx based on that.  - PPx Vancomycin started for MRSA coverage, MRSA nares came negative  - F/u ID      #Misc  -DVT prophylaxis: Lovenox  -GI prophylaxis: PPI  -Diet: Soft and bite sized as tolerated  -Activity: AAT  -Dispo: Acute

## 2022-06-22 LAB
ALBUMIN SERPL ELPH-MCNC: 3.7 G/DL — SIGNIFICANT CHANGE UP (ref 3.5–5.2)
ALP SERPL-CCNC: 80 U/L — SIGNIFICANT CHANGE UP (ref 30–115)
ALT FLD-CCNC: 16 U/L — SIGNIFICANT CHANGE UP (ref 0–41)
ANION GAP SERPL CALC-SCNC: 11 MMOL/L — SIGNIFICANT CHANGE UP (ref 7–14)
AST SERPL-CCNC: 20 U/L — SIGNIFICANT CHANGE UP (ref 0–41)
BASOPHILS # BLD AUTO: 0.01 K/UL — SIGNIFICANT CHANGE UP (ref 0–0.2)
BASOPHILS NFR BLD AUTO: 0.1 % — SIGNIFICANT CHANGE UP (ref 0–1)
BILIRUB SERPL-MCNC: 0.5 MG/DL — SIGNIFICANT CHANGE UP (ref 0.2–1.2)
BUN SERPL-MCNC: 11 MG/DL — SIGNIFICANT CHANGE UP (ref 10–20)
CALCIUM SERPL-MCNC: 9 MG/DL — SIGNIFICANT CHANGE UP (ref 8.5–10.1)
CHLORIDE SERPL-SCNC: 102 MMOL/L — SIGNIFICANT CHANGE UP (ref 98–110)
CO2 SERPL-SCNC: 25 MMOL/L — SIGNIFICANT CHANGE UP (ref 17–32)
CREAT SERPL-MCNC: 0.5 MG/DL — LOW (ref 0.7–1.5)
EGFR: 116 ML/MIN/1.73M2 — SIGNIFICANT CHANGE UP
EOSINOPHIL # BLD AUTO: 0 K/UL — SIGNIFICANT CHANGE UP (ref 0–0.7)
EOSINOPHIL NFR BLD AUTO: 0 % — SIGNIFICANT CHANGE UP (ref 0–8)
GLUCOSE SERPL-MCNC: 133 MG/DL — HIGH (ref 70–99)
HCT VFR BLD CALC: 34.5 % — LOW (ref 37–47)
HGB BLD-MCNC: 12.1 G/DL — SIGNIFICANT CHANGE UP (ref 12–16)
IMM GRANULOCYTES NFR BLD AUTO: 0.5 % — HIGH (ref 0.1–0.3)
LYMPHOCYTES # BLD AUTO: 0.9 K/UL — LOW (ref 1.2–3.4)
LYMPHOCYTES # BLD AUTO: 9.7 % — LOW (ref 20.5–51.1)
MAGNESIUM SERPL-MCNC: 2.3 MG/DL — SIGNIFICANT CHANGE UP (ref 1.8–2.4)
MCHC RBC-ENTMCNC: 32.8 PG — HIGH (ref 27–31)
MCHC RBC-ENTMCNC: 35.1 G/DL — SIGNIFICANT CHANGE UP (ref 32–37)
MCV RBC AUTO: 93.5 FL — SIGNIFICANT CHANGE UP (ref 81–99)
MONOCYTES # BLD AUTO: 0.31 K/UL — SIGNIFICANT CHANGE UP (ref 0.1–0.6)
MONOCYTES NFR BLD AUTO: 3.3 % — SIGNIFICANT CHANGE UP (ref 1.7–9.3)
NEUTROPHILS # BLD AUTO: 8.03 K/UL — HIGH (ref 1.4–6.5)
NEUTROPHILS NFR BLD AUTO: 86.4 % — HIGH (ref 42.2–75.2)
NRBC # BLD: 0 /100 WBCS — SIGNIFICANT CHANGE UP (ref 0–0)
PHOSPHATE SERPL-MCNC: 3.7 MG/DL — SIGNIFICANT CHANGE UP (ref 2.1–4.9)
PLATELET # BLD AUTO: 254 K/UL — SIGNIFICANT CHANGE UP (ref 130–400)
POTASSIUM SERPL-MCNC: 4.3 MMOL/L — SIGNIFICANT CHANGE UP (ref 3.5–5)
POTASSIUM SERPL-SCNC: 4.3 MMOL/L — SIGNIFICANT CHANGE UP (ref 3.5–5)
PROT SERPL-MCNC: 6.6 G/DL — SIGNIFICANT CHANGE UP (ref 6–8)
RBC # BLD: 3.69 M/UL — LOW (ref 4.2–5.4)
RBC # FLD: 12 % — SIGNIFICANT CHANGE UP (ref 11.5–14.5)
SODIUM SERPL-SCNC: 138 MMOL/L — SIGNIFICANT CHANGE UP (ref 135–146)
WBC # BLD: 9.3 K/UL — SIGNIFICANT CHANGE UP (ref 4.8–10.8)
WBC # FLD AUTO: 9.3 K/UL — SIGNIFICANT CHANGE UP (ref 4.8–10.8)

## 2022-06-22 PROCEDURE — 99232 SBSQ HOSP IP/OBS MODERATE 35: CPT | Mod: 25

## 2022-06-22 PROCEDURE — 99233 SBSQ HOSP IP/OBS HIGH 50: CPT

## 2022-06-22 RX ADMIN — AMPICILLIN SODIUM AND SULBACTAM SODIUM 200 GRAM(S): 250; 125 INJECTION, POWDER, FOR SUSPENSION INTRAMUSCULAR; INTRAVENOUS at 17:14

## 2022-06-22 RX ADMIN — AMPICILLIN SODIUM AND SULBACTAM SODIUM 200 GRAM(S): 250; 125 INJECTION, POWDER, FOR SUSPENSION INTRAMUSCULAR; INTRAVENOUS at 11:56

## 2022-06-22 RX ADMIN — PANTOPRAZOLE SODIUM 40 MILLIGRAM(S): 20 TABLET, DELAYED RELEASE ORAL at 05:17

## 2022-06-22 RX ADMIN — AMPICILLIN SODIUM AND SULBACTAM SODIUM 200 GRAM(S): 250; 125 INJECTION, POWDER, FOR SUSPENSION INTRAMUSCULAR; INTRAVENOUS at 23:46

## 2022-06-22 RX ADMIN — Medication 5 MILLIGRAM(S): at 21:16

## 2022-06-22 RX ADMIN — Medication 6 MILLIGRAM(S): at 05:17

## 2022-06-22 RX ADMIN — AMPICILLIN SODIUM AND SULBACTAM SODIUM 200 GRAM(S): 250; 125 INJECTION, POWDER, FOR SUSPENSION INTRAMUSCULAR; INTRAVENOUS at 05:17

## 2022-06-22 NOTE — DISCHARGE NOTE PROVIDER - PROVIDER TOKENS
PROVIDER:[TOKEN:[[id number]],FOLLOWUP:[2 weeks]],PROVIDER:[TOKEN:[[id number]],FOLLOWUP:[2 weeks]],PROVIDER:[TOKEN:[[id number]],FOLLOWUP:[2 weeks]] PROVIDER:[TOKEN:[[id number]],FOLLOWUP:[2 weeks]],PROVIDER:[TOKEN:[[id number]],FOLLOWUP:[2 weeks]],PROVIDER:[TOKEN:[[id number]],FOLLOWUP:[2 weeks]]

## 2022-06-22 NOTE — CONSULT NOTE ADULT - ASSESSMENT
47-year-old Mandarin speaking female with no past medical history presented with sore throat for the last 4 days.  Patient states that she has had progressive odynophagia and dysphagia with mild drooling for the last 4 days that has now progressed to a hoarse voice, chills, edema to the right submandibular region.  Patient also noticed pain with range of motion of her neck.  Went to her primary care physician yesterday gave her azithromycin.  Is able to tolerate p.o. denies fever, SOB.   On admission: afebrile, hemodynamically stable, saturating well on RA, COVID + (was unaware), WBC 14.69, Hg 11.7, CT neck showed: Right tonsillar enlargement with a peritonsillar fluid collection   measuring 0.7 x 0.6 cm. Asymmetric soft tissue thickening along the right oropharynx/hypopharynx  extending towards larynx with soft tissue mass within the right laryngeal  ventricle measuring approximately 2.3 x 1.2 cm. Recommend repeat  examination after appropriate antimicrobial treatment to document  resolution. If finding persists, recommend visual inspection for further  evaluation.    IMPRESSION;  # Peritonsillar abscess > presently has no complaints. Responding to ABx. Will cover grA strep  #COVID with a positive test and asymptomatic . No pulmonary complaints CXR no GGO. On RA  -s/p vaccination and boosted    RECOMMENDATIONS;   No treatment for COVID-19   Po Augmentin 875 mg q12h till 7/2  Please do not hesitate to recall ID if any questions arise either through [email or web address] or through [employer] teams 
47-year-old Mandarin speaking female with sore throat for the last 4 days - CT neck showed: "Right tonsillar enlargement with a peritonsillar fluid collection measuring 0.7 x 0.6 cm. Asymmetric soft tissue thickening along the right oropharynx/hypopharynx extending towards larynx with soft tissue mass/fullness within the right laryngeal ventricle measuring approximately 2.3 x 1.2 cm. Now s/p needle aspiration of right peritonsillar abscess. Admitted for laryngeal edema and IV abx.     Plan:  - Case and imaging discussed with Dr. Brennan, recommendations below:  - S/p needle aspiration of right peritonsillar abscess with 1cc of blood removed  - Patient feeling slightly better after procedure  - Continue pain control  - Recommend ID consult; Continue IV abx per ID recs  - Continue steroids  - Recommend OP f/u w/ ENT when discharged   - Will repeat scope post abx treatment  - Will follow
IMPRESSION:    Peritonsillar abscess SP needle aspiration  NO clinical nor radiological evidence of airway compromise   COVID infection      PLAN:    CNS: No depressants    HEENT: Oral care.  ENT follow up appreciated.  Needs OP ENT FU     PULMONARY:  HOB @ 45 degrees.  Aspiration precautions.  CXR     CARDIOVASCULAR: Avoid overload     GI: GI prophylaxis.  Feeding as tolerated.  Bowel regimen     RENAL:  Follow up lytes.  Correct as needed    INFECTIOUS DISEASE: Follow up cultures. Unasyn.  Augmentin upon DC     HEMATOLOGICAL:  DVT prophylaxis.    ENDOCRINE:  Follow up FS.      MUSCULOSKELETAL:  OOB to chair     NO need for ICU.  Recall if status changes

## 2022-06-22 NOTE — DISCHARGE NOTE PROVIDER - CARE PROVIDER_API CALL
Gary Aldrich  Internal Medicine  [facility],    Phone: ()-  Fax: ()-  Follow Up Time: 2 weeks    López Blancas)  [facility]  [address] [place] [address]  Phone: [phone]  Fax: [phone]  Follow Up Time: 2 weeks    Morris Maldonado)  Infectious Disease; Internal Medicine  [facility] [place] [address]  Phone: [phone]  Fax: [phone]  Follow Up Time: 2 weeks   Gary Aldrich  Internal Medicine  [facility],    Phone: ()-  Fax: ()-  Follow Up Time: 2 weeks    López Blancas)  [facility]  [address] [place] [address]  Phone: [phone]  Fax: [phone]  Follow Up Time: 2 weeks    Stephen Vasquez)  Infectious Disease; Internal Medicine  [facility] [place] [address]  Phone: [phone]  Fax: [phone]  Follow Up Time: 2 weeks

## 2022-06-22 NOTE — PROGRESS NOTE ADULT - SUBJECTIVE AND OBJECTIVE BOX
JOSESITO PARADA  47y  Female      Patient is a 47y old  Female who presents with a chief complaint of swelling and pain over Rt. submandibular region.     INTERVAL HPI/OVERNIGHT EVENTS:      ******************************* REVIEW OF SYSTEMS:**********************************************    All other review of systems negative    *********************** VITALS ******************************************    T(F): 98.5 (06-22-22 @ 13:30)  HR: 83 (06-22-22 @ 13:30) (73 - 83)  BP: 106/67 (06-22-22 @ 13:30) (106/67 - 118/66)  RR: 18 (06-22-22 @ 13:30) (18 - 19)  SpO2: 98% (06-22-22 @ 13:30) (97% - 99%)            ******************************** PHYSICAL EXAM:**************************************************  GENERAL: NAD    PSYCH: no agitation, baseline mentation  HEENT: swollen right tonsillar area     NERVOUS SYSTEM:  Alert & Oriented X3,   PULMONARY: JUNO, CTA    CARDIOVASCULAR: S1S2 RRR    GI: Soft, NT, ND; BS present.    EXTREMITIES:  2+ Peripheral Pulses, No clubbing, cyanosis, or edema    LYMPH: No lymphadenopathy noted    SKIN: No rashes or lesions      **************************** LABS *******************************************************                          12.1   9.30  )-----------( 254      ( 22 Jun 2022 07:51 )             34.5     06-22    138  |  102  |  11  ----------------------------<  133<H>  4.3   |  25  |  0.5<L>    Ca    9.0      22 Jun 2022 07:51  Phos  3.7     06-22  Mg     2.3     06-22    TPro  6.6  /  Alb  3.7  /  TBili  0.5  /  DBili  x   /  AST  20  /  ALT  16  /  AlkPhos  80  06-22          Lactate Trend        CAPILLARY BLOOD GLUCOSE              **************************Active Medications *******************************************  No Known Allergies      acetaminophen    Suspension .. 775 milliGRAM(s) Oral every 6 hours PRN  ampicillin/sulbactam  IVPB      ampicillin/sulbactam  IVPB 3 Gram(s) IV Intermittent every 6 hours  dexAMETHasone  Injectable 6 milliGRAM(s) IV Push daily  influenza   Vaccine 0.5 milliLiter(s) IntraMuscular once  melatonin 5 milliGRAM(s) Oral at bedtime  oxycodone    5 mG/acetaminophen 325 mG 1 Tablet(s) Oral once PRN  pantoprazole    Tablet 40 milliGRAM(s) Oral before breakfast      ***************************************************  RADIOLOGY & ADDITIONAL TESTS:    Imaging Personally Reviewed:  [ ] YES  [ ] NO    HEALTH ISSUES - PROBLEM Dx:

## 2022-06-22 NOTE — PROGRESS NOTE ADULT - NS ATTEND AMEND GEN_ALL_CORE FT
Personally seen and examined patient. Supervised needle aspiration of right peritonsillar space with 18g needle; bloody aspirate, sent for culture. No trismus or voice changes. Continue to follow.
Seen and examined. Symptoms improved, no trismus or voice changes. Suggest continued abx. Needs follow-up CT scan as outpatient for ?laryngeal ventricle mass noted on admission CT.

## 2022-06-22 NOTE — DISCHARGE NOTE PROVIDER - NSDCMRMEDTOKEN_GEN_ALL_CORE_FT
amoxicillin-clavulanate 875 mg-125 mg oral tablet: 1 tab(s) orally 2 times a day   predniSONE 20 mg oral tablet: 2 tab(s) orally once a day

## 2022-06-22 NOTE — CONSULT NOTE ADULT - SUBJECTIVE AND OBJECTIVE BOX
Patient is a 47y old  Female who presents with a chief complaint of Right PTA, Laryngeal edema (19 Jun 2022 18:24)      HPI:  47-year-old Mandarin speaking female with no past medical history presented with sore throat for the last 4 days.  Patient states that she has had progressive odynophagia and dysphagia with mild drooling for the last 4 days that has now progressed to a hoarse voice, chills, edema to the right submandibular region.  Patient also noticed pain with range of motion of her neck.  Went to her primary care physician yesterday gave her azithromycin.  Is able to tolerate p.o. denies fever, SOB.   On admission: afebrile, hemodynamically stable, saturating well on RA, COVID + (was unaware), WBC 14.69, Hg 11.7, CT neck showed: Right tonsillar enlargement with a peritonsillar fluid collection   measuring 0.7 x 0.6 cm. Asymmetric soft tissue thickening along the right oropharynx/hypopharynx  extending towards larynx with soft tissue mass within the right laryngeal  ventricle measuring approximately 2.3 x 1.2 cm. Recommend repeat  examination after appropriate antimicrobial treatment to document  resolution. If finding persists, recommend visual inspection for further  evaluation.             (19 Jun 2022 13:29)      PAST MEDICAL & SURGICAL HISTORY:      SOCIAL HX:   Smoking                         ETOH                            Other    FAMILY HISTORY:  :  No known cardiovacular family hisotry     Review Of Systems:     All ROS are negative except per HPI       Allergies    No Known Allergies    Intolerances          PHYSICAL EXAM    ICU Vital Signs Last 24 Hrs  T(C): 36.9 (20 Jun 2022 06:30), Max: 36.9 (20 Jun 2022 06:30)  T(F): 98.4 (20 Jun 2022 06:30), Max: 98.4 (20 Jun 2022 06:30)  HR: 82 (20 Jun 2022 06:30) (81 - 92)  BP: 111/56 (20 Jun 2022 06:30) (111/56 - 123/66)  BP(mean): --  ABP: --  ABP(mean): --  RR: 18 (20 Jun 2022 06:30) (16 - 18)  SpO2: 98% (20 Jun 2022 06:30) (98% - 99%)      CONSTITUTIONAL:  Well nourished.  IN NAD    ENT:   Airway patent,   Mouth with normal mucosa.   NO stridor       CARDIAC:   Normal rate,   Regular rhythm.        RESPIRATORY:   No wheezing  Bilateral BS   Not tachypneic,  No use of accessory muscles    GASTROINTESTINAL:  Abdomen soft,   Non-tender,   No guarding,   + BS      NEUROLOGICAL:   Alert and oriented   No motor deficits.    SKIN:   Skin normal color for race,   No evidence of rash.                LABS:                          11.7   14.69 )-----------( 204      ( 19 Jun 2022 10:24 )             34.0                                               06-19    136  |  99  |  7<L>  ----------------------------<  110<H>  4.1   |  24  |  0.7    Ca    8.9      19 Jun 2022 10:24    TPro  7.0  /  Alb  4.3  /  TBili  1.9<H>  /  DBili  x   /  AST  16  /  ALT  9   /  AlkPhos  85  06-19      PT/INR - ( 19 Jun 2022 10:24 )   PT: 13.50 sec;   INR: 1.18 ratio         PTT - ( 19 Jun 2022 10:24 )  PTT:31.9 sec                                                                                     LIVER FUNCTIONS - ( 19 Jun 2022 10:24 )  Alb: 4.3 g/dL / Pro: 7.0 g/dL / ALK PHOS: 85 U/L / ALT: 9 U/L / AST: 16 U/L / GGT: x                                                                                                                                       X-Rays reviewed                                                                                     ECHO        MEDICATIONS  (STANDING):  ampicillin/sulbactam  IVPB      ampicillin/sulbactam  IVPB 3 Gram(s) IV Intermittent every 6 hours  dexAMETHasone  Injectable 6 milliGRAM(s) IV Push daily    MEDICATIONS  (PRN):        
  JOSESITO PARADA  47y, Female  Allergy: No Known Allergies      All historical available data reviewed.    HPI:  47-year-old Mandarin speaking female with no past medical history presented with sore throat for the last 4 days.  Patient states that she has had progressive odynophagia and dysphagia with mild drooling for the last 4 days that has now progressed to a hoarse voice, chills, edema to the right submandibular region.  Patient also noticed pain with range of motion of her neck.  Went to her primary care physician yesterday gave her azithromycin.  Is able to tolerate p.o. denies fever, SOB.   On admission: afebrile, hemodynamically stable, saturating well on RA, COVID + (was unaware), WBC 14.69, Hg 11.7, CT neck showed: Right tonsillar enlargement with a peritonsillar fluid collection   measuring 0.7 x 0.6 cm. Asymmetric soft tissue thickening along the right oropharynx/hypopharynx  extending towards larynx with soft tissue mass within the right laryngeal  ventricle measuring approximately 2.3 x 1.2 cm. Recommend repeat  examination after appropriate antimicrobial treatment to document  resolution. If finding persists, recommend visual inspection for further  evaluation.             (19 Jun 2022 13:29)    FAMILY HISTORY:    PAST MEDICAL & SURGICAL HISTORY:        VITALS:  T(F): 96.9, Max: 98.6 (06-21-22 @ 13:15)  HR: 73  BP: 118/66  RR: 19Vital Signs Last 24 Hrs  T(C): 36.1 (22 Jun 2022 05:20), Max: 37 (21 Jun 2022 13:15)  T(F): 96.9 (22 Jun 2022 05:20), Max: 98.6 (21 Jun 2022 13:15)  HR: 73 (22 Jun 2022 05:20) (73 - 82)  BP: 118/66 (22 Jun 2022 05:20) (111/64 - 118/66)  BP(mean): --  RR: 19 (22 Jun 2022 05:20) (17 - 19)  SpO2: 99% (22 Jun 2022 05:20) (97% - 99%)    TESTS & MEASUREMENTS:                        12.1   9.30  )-----------( 254      ( 22 Jun 2022 07:51 )             34.5     06-22    138  |  102  |  11  ----------------------------<  133<H>  4.3   |  25  |  0.5<L>    Ca    9.0      22 Jun 2022 07:51  Phos  3.7     06-22  Mg     2.3     06-22    TPro  6.6  /  Alb  3.7  /  TBili  0.5  /  DBili  x   /  AST  20  /  ALT  16  /  AlkPhos  80  06-22    LIVER FUNCTIONS - ( 22 Jun 2022 07:51 )  Alb: 3.7 g/dL / Pro: 6.6 g/dL / ALK PHOS: 80 U/L / ALT: 16 U/L / AST: 20 U/L / GGT: x             Culture - Blood (collected 06-19-22 @ 10:24)  Source: .Blood Blood-Peripheral  Preliminary Report (06-20-22 @ 19:01):    No growth to date.    Culture - Blood (collected 06-19-22 @ 10:24)  Source: .Blood Blood-Peripheral  Preliminary Report (06-20-22 @ 19:01):    No growth to date.            RADIOLOGY & ADDITIONAL TESTS:  Personal review of radiological diagnostics performed  Echo and EKG results noted when applicable.     MEDICATIONS:  acetaminophen    Suspension .. 775 milliGRAM(s) Oral every 6 hours PRN  ampicillin/sulbactam  IVPB      ampicillin/sulbactam  IVPB 3 Gram(s) IV Intermittent every 6 hours  dexAMETHasone  Injectable 6 milliGRAM(s) IV Push daily  influenza   Vaccine 0.5 milliLiter(s) IntraMuscular once  melatonin 5 milliGRAM(s) Oral at bedtime  oxycodone    5 mG/acetaminophen 325 mG 1 Tablet(s) Oral once PRN  pantoprazole    Tablet 40 milliGRAM(s) Oral before breakfast      ANTIBIOTICS:  ampicillin/sulbactam  IVPB      ampicillin/sulbactam  IVPB 3 Gram(s) IV Intermittent every 6 hours    
ENT Consult Note: #[id number]:  47-year-old Mandarin speaking female with no past medical history presented with sore throat for the last 4 days. CT neck showed: "Right tonsillar enlargement with a peritonsillar fluid collection measuring 0.7 x 0.6 cm. Asymmetric soft tissue thickening along the right oropharynx/hypopharynx extending towards larynx with soft tissue mass/fullness within the right laryngeal ventricle measuring approximately 2.3 x 1.2 cm. Recommend repeat examination after appropriate antimicrobial treatment to document  resolution. If finding persists, recommend visual inspection for further evaluation." ENT called for further evaluation. Patient seen and examined at bedside - states that she has had progressive odynophagia and dysphagia with mild drooling for the last 4 days that has now progressed to a hoarse voice, chills, edema to the right submandibular region.  Patient also noticed pain with range of motion of her neck.  Went to her primary care physician yesterday gave her azithromycin.  Is able to tolerate p.o. denies fever, SOB. On admission: afebrile, hemodynamically stable, saturating well on RA, COVID +.     Allergies: No Known Allergies    MEDICATIONS  (STANDING):  ampicillin/sulbactam  IVPB      ampicillin/sulbactam  IVPB 3 Gram(s) IV Intermittent every 6 hours  dexAMETHasone  Injectable 6 milliGRAM(s) IV Push daily    [ x ] A 10 Point Review of Systems was negative except where noted    Vital Signs Last 24 Hrs  T(C): 36.1 (19 Jun 2022 08:28), Max: 36.1 (19 Jun 2022 08:28)  T(F): 97 (19 Jun 2022 08:28), Max: 97 (19 Jun 2022 08:28)  HR: 92 (19 Jun 2022 08:28) (92 - 92)  BP: 118/71 (19 Jun 2022 08:28) (118/71 - 118/71)  RR: 18 (19 Jun 2022 08:28) (18 - 18)  SpO2: 99% (19 Jun 2022 08:28) (99% - 99%)    PHYSICAL EXAM:  Gen: Well-developed, well-nourished, NAD.  No drooling or pooling of secretions.  Muffled vocal quality, no hoarseness  Skin: Good color, non diaphoretic  Head: NC/AT.   EENT:  Nares bilaterally patent, no blood or discharge noted. Tongue wnl, uvula deviated to the left, +erythema and edema noted to the right peritonsillar space. No tenderness throughout FOM. Posterior oropharynx clear, no blood/discharge noted.   Neck: Trachea midline, supple, +mild right submandibular edema/induration.   Resp: Breathing easily, no accessory muscle use, no stridor or stertor.    Cardio: +S1/S2  Abd: Soft, nontender, nondistended  Neuro: Awake and alert  Psych: Normal mood, normal affect  Ext: No peripheral edema/cyanosis, STAPLETON x 4    Procedure: Needle aspiration of right peritonsillar abscess, 1cc of blood drained (no purulence aspirated) from right peritonsillar space. Patient tolerated procedure well, no complications.  Fiberoptic Laryngoscopy: No masses or lesions noted to NP/OP/HP. Laryngeal structures intact, no edema or erythema noted. Epiglottis crisp, no edema. TVC/FVC mobile and intact, no glottic gap noted.     LABS:                      11.7   14.69 )-----------( 204      ( 19 Jun 2022 10:24 )             34.0     06-19  136  |  99  |  7<L>  ----------------------------<  110<H>  4.1   |  24  |  0.7    Ca    8.9      19 Jun 2022 10:24  TPro  7.0  /  Alb  4.3  /  TBili  1.9<H>  /  DBili  x   /  AST  16  /  ALT  9   /  AlkPhos  85  06-19    PT/INR - ( 19 Jun 2022 10:24 )   PT: 13.50 sec;   INR: 1.18 ratio    PTT - ( 19 Jun 2022 10:24 )  PTT:31.9 sec    IMAGING/ADDITIONAL STUDIES:   ACC: [id number] EXAM:  CT NECK SOFT TISSUE IC                          PROCEDURE DATE:  06/19/2022          INTERPRETATION:  CLINICAL HISTORY/REASON FOR EXAM: Right submandibular   abscess.    TECHNIQUE: CT of the soft tissues of the neck was obtained after the   administration of intravenous contrast . Sagittal and coronal reformatted   images were generated. 75 mL of Omnipaque 350 was administered   intravenously. 0 mL was discarded.    COMPARISON: None.    FINDINGS:  Right tonsillar enlargement with a peritonsillar fluid collection   measuring 0.7 x 0.6 cm. Additionally there is asymmetric soft tissue   thickening along the right oropharynx/hypopharynx extending towards   larynx with soft tissue mass within the right laryngeal ventricle   measuring approximately 2.3 x 1.2 cm.    Prominent right submandibular lymph nodes. Mildly enlarged and edematous   right submandibular gland.    The nasopharynx and oral cavity are within normal limits. The trachea is   within normal limits.    The bilateral parotid and left submandibular glands are unremarkable.    The thyroid gland is within normal limits.    The visualized intracranial structures are within normal limits. Orbital   contents are unremarkable. Paranasal sinuses are clear.    The visualized lung apices are clear.    IMPRESSION:  Right tonsillar enlargement with a peritonsillar fluid collection   measuring 0.7 x 0.6 cm.    Asymmetric soft tissue thickening along the right oropharynx/hypopharynx   extending towards larynx with soft tissue mass within the right laryngeal   ventricle measuring approximately 2.3 x 1.2 cm. Recommend repeat   examination after appropriate antimicrobial treatment to document   resolution. If finding persists, recommend visual inspection for further   evaluation.    Findings were discussed by Dr. DIDI Lane with Dr. CHENCHO BIRMINGHAM on 6/19/2022 at   1:05 PM.    --- End of Report ---  RONY LANE MD; Resident Radiologist  This document has been electronically signed.  JUNE FLORES MD; Attending Radiologist  This document has been electronically signed. Jun 19 2022  1:11PM

## 2022-06-22 NOTE — PROGRESS NOTE ADULT - SUBJECTIVE AND OBJECTIVE BOX
ENT Progress Note:         PAST MEDICAL & SURGICAL HISTORY:    Allergies: No Known Allergies    MEDICATIONS  (STANDING):  ampicillin/sulbactam  IVPB      ampicillin/sulbactam  IVPB 3 Gram(s) IV Intermittent every 6 hours  dexAMETHasone  Injectable 6 milliGRAM(s) IV Push daily  influenza   Vaccine 0.5 milliLiter(s) IntraMuscular once  melatonin 5 milliGRAM(s) Oral at bedtime  pantoprazole    Tablet 40 milliGRAM(s) Oral before breakfast    MEDICATIONS  (PRN):  acetaminophen    Suspension .. 775 milliGRAM(s) Oral every 6 hours PRN Mild Pain (1 - 3), Moderate Pain (4 - 6)  oxycodone    5 mG/acetaminophen 325 mG 1 Tablet(s) Oral once PRN Severe Pain (7 - 10)      FAMILY HISTORY:    Social History:      [ x ] A 10 Point Review of Systems was negative except where noted  [    ] Due to altered mental status/intubation, subjective information was not able to be obtained from the patient. History was obtained to the extent possible from review of the chart and collateral sources of information.     Vital Signs Last 24 Hrs  T(C): 36.1 (22 Jun 2022 05:20), Max: 37 (21 Jun 2022 13:15)  T(F): 96.9 (22 Jun 2022 05:20), Max: 98.6 (21 Jun 2022 13:15)  HR: 73 (22 Jun 2022 05:20) (73 - 82)  BP: 118/66 (22 Jun 2022 05:20) (111/64 - 118/66)  BP(mean): --  RR: 19 (22 Jun 2022 05:20) (17 - 19)  SpO2: 99% (22 Jun 2022 05:20) (97% - 99%)    PHYSICAL EXAM:  Gen: Well-developed, well-nourished, NAD.  No drooling or pooling of secretions.  Good vocal quality, no hoarseness  Skin: Good color, non diaphoretic  Head: NC/AT.   EENT: B/l EACs clear, TMs intact. Nares bilaterally patent, no blood or discharge noted. Oral cavity no erythema/edema. Tongue wnl, uvula midline, no tenderness throughout FOM. Posterior oropharynx clear, no blood/discharge noted.   Neck: Trachea midline, supple  Resp: Breathing easily, no accessory muscle use, no stridor or stertor.    Cardio: +S1/S2  Abd: Soft, nontender, nondistended  Neuro: Awake and alert  Psych: Normal mood, normal affect  Ext: No peripheral edema/cyanosis, STAPLETON x 4    Fiberoptic Laryngoscopy: No masses or lesions noted to NP/OP/HP. Laryngeal structures intact, no edema or erythema noted. Epiglottis crisp, no edema. TVC/FVC mobile and intact, no glottic gap noted.       LABS:                        12.1   9.30  )-----------( 254      ( 22 Jun 2022 07:51 )             34.5     06-22    138  |  102  |  11  ----------------------------<  133<H>  4.3   |  25  |  0.5<L>    Ca    9.0      22 Jun 2022 07:51  Phos  3.7     06-22  Mg     2.3     06-22    TPro  6.6  /  Alb  3.7  /  TBili  0.5  /  DBili  x   /  AST  20  /  ALT  16  /  AlkPhos  80  06-22          IMAGING/ADDITIONAL STUDIES:  ENT Progress Note: 47 year old Female Mandarin speaking with no significant PMH a/w Right PTA s/p bedside FNA x2. Patient seen and examined at bedside with Dr. Blancas this afternoon. Patient reports improvement of sore throat and states that her voice is back to baseline. Patient is also able to tolerate po and is asking when she will be discharged home. Denies any fever, chills, SOB/difficulty breathing, decrease tolerance of own secretions, odynophagia Denies any metallic taste in her mouth. No acute overnight events.     Allergies: No Known Allergies    MEDICATIONS  (STANDING):  ampicillin/sulbactam  IVPB      ampicillin/sulbactam  IVPB 3 Gram(s) IV Intermittent every 6 hours  dexAMETHasone  Injectable 6 milliGRAM(s) IV Push daily  influenza   Vaccine 0.5 milliLiter(s) IntraMuscular once  melatonin 5 milliGRAM(s) Oral at bedtime  pantoprazole    Tablet 40 milliGRAM(s) Oral before breakfast    MEDICATIONS  (PRN):  acetaminophen    Suspension .. 775 milliGRAM(s) Oral every 6 hours PRN Mild Pain (1 - 3), Moderate Pain (4 - 6)  oxycodone    5 mG/acetaminophen 325 mG 1 Tablet(s) Oral once PRN Severe Pain (7 - 10)    [ x ] A 10 Point Review of Systems was negative except where noted    Vital Signs Last 24 Hrs  T(C): 36.1 (22 Jun 2022 05:20), Max: 37 (21 Jun 2022 13:15)  T(F): 96.9 (22 Jun 2022 05:20), Max: 98.6 (21 Jun 2022 13:15)  HR: 73 (22 Jun 2022 05:20) (73 - 82)  BP: 118/66 (22 Jun 2022 05:20) (111/64 - 118/66)  RR: 19 (22 Jun 2022 05:20) (17 - 19)  SpO2: 99% (22 Jun 2022 05:20) (97% - 99%)    PHYSICAL EXAM:  GEN: NAD, awake and alert. No drooling or pooling of secretions. No stridor or stertor. Improved vocal quality, no longer muffled - baseline per pt  SKIN: Good color, non diaphoretic  HEENT: No longer with trismus. Oral mucosa pink and moist. + mildly improved edema to the right peritonsillar space, extending to the hard palate. No erythema or edema noted to buccal mucosa, tongue, FOM.  NECK:  Trachea midline. +No longer with TTP to left lateral neck, no palpable LAD, no fluctuance. FROM of neck.  RESP: No dyspnea, non-labored breathing. No use of accessory muscles.  CARDIO: +S1/S2  ABDO: Soft, NT.  EXT: STAPLETON x 4    LABS:                     12.1   9.30  )-----------( 254      ( 22 Jun 2022 07:51 )             34.5     06-22  138  |  102  |  11  ----------------------------<  133<H>  4.3   |  25  |  0.5<L>    Ca    9.0      22 Jun 2022 07:51  Phos  3.7     06-22  Mg     2.3     06-22    TPro  6.6  /  Alb  3.7  /  TBili  0.5  /  DBili  x   /  AST  20  /  ALT  16  /  AlkPhos  80  06-22

## 2022-06-22 NOTE — DISCHARGE NOTE PROVIDER - CARE PROVIDERS DIRECT ADDRESSES
[email or web address],[email or web address],DirectAddress_Unknown

## 2022-06-22 NOTE — DISCHARGE NOTE PROVIDER - HOSPITAL COURSE
Patient is a 47y old Female who presents with a chief complaint of Right PTA, Laryngeal edema (19 Jun 2022 18:24)  Admitted in ICU because of airway concern, speech and swallow was consulted and she was cleared for PO diet. ENT on board, recs to c/w dexamethasone.  Now downgraded as there is no airway concern. Currently admitted to medicine with the primary diagnosis of Peritonsillar abscess.      #Peritonsillar abscess SP needle aspiration  NO clinical nor radiological evidence of airway compromise   #COVID 19 positive    * 06/21:  Pt c/o worsening symptoms this AM - pain & swelling in mouth, increased neck pain, breathing okay and tolerating some liquid PO.   - concern of recurrent abcess  - pain control prn  -cont soft diet/full liquid as tolerated  - Decadron 6mg started, double the dose at 4pm  - bedside needle aspiration performed- specimen sent for cultures and will reconsider ABx based on that.  - PPx Vancomycin started for MRSA coverage, MRSA nares came negative  - F/u ID      #Misc  -DVT prophylaxis: Lovenox  -GI prophylaxis: PPI  -Diet: Soft and bite sized as tolerated  -Activity: AAT  -Dispo: Acute      # Peritonsillar abscess > presently has no complaints. Responding to ABx. Will cover grA strep  #COVID with a positive test and asymptomatic . No pulmonary complaints CXR no GGO. On RA  -s/p vaccination and boosted    RECOMMENDATIONS;   No treatment for COVID-19   Po Augmentin 875 mg q12h till 7/2  Please do not hesitate to recall ID if any questions arise either through [facility] or through [facility] teams      Patient is a 47y old Female who presents with a chief complaint of Right PTA, Laryngeal edema (19 Jun 2022 18:24)  Admitted to ICU because of airway concern, speech and swallow was consulted and she was cleared for PO diet. ENT recs appreciated to c/w dexamethasone.  Was downgraded to floors as there was no airway concern. Currently admitted to medicine with the primary diagnosis of Peritonsillar abscess.    #Peritonsillar abscess  NO clinical nor radiological evidence of airway compromise   #COVID 19 positive  - pain control prn  -  initially on soft bite sized diet  - Decadron 6mg started   - Unasyn started  - bedside needle aspiration performed- specimen sent for cultures came out negative  - blood cx >negative  -DVT prophylaxis: Lovenox  -GI prophylaxis: PPI    Pt. has no complaints, responding to antibiotics and steroids, group A strep coverage given. Tested positive for COVID-19 but is asymptomatic. No pulmonary complaints, CXR is normal. Spo2 98% on room air.     Responding to ABx. Will cover grA strep  #COVID with a positive test and asymptomatic . No pulmonary complaints CXR no GGO. On RA  -s/p vaccination and booster   No treatment for COVID-19   Po Augmentin 875 mg q12h till 7/2  Please do not hesitate to recall ID if any questions arise either through [facility] or through [facility] teams      Patient is a 47y old Mandarin speaking Female with no significant PMHx presents with a chief complaint of Right PTA, Laryngeal edema (19 Jun 2022 18:24)  Admitted to ICU because of airway concern, speech and swallow was consulted and she was cleared for PO diet. ENT recs appreciated to c/w dexamethasone.  Was downgraded to floors as there was no airway concern. Currently admitted to medicine with the primary diagnosis of Peritonsillar abscess.    #Peritonsillar abscess  -No clinical nor radiological evidence of airway compromise   - pain control prn  -  initially on soft bite sized diet  - Decadron 6mg started   - Unasyn started  - bedside needle aspiration performed- specimen sent for cultures came out negative  -group A strep coverage given.  - blood cx >negative, MRSA PCR--> negative  -Advanced to regular diet--> tolerating well  -Pt. has no complaints, responding to antibiotics and steroids, VS stable  - Will be discharged with PO Augmentin 875 mg q12h till 7/2 and Decadron 40mg for 3 days   - F/u with Dr. Blancas for ENT      #COVID 19 positive  -s/p vaccination and booster  -Asymptomatic. No pulmonary complaints,  -CXR insignificant  -Spo2 98% on room air.    Pt. placed on:  -DVT prophylaxis: Lovenox  -GI prophylaxis: PPI  - Activity- AAT

## 2022-06-22 NOTE — PROGRESS NOTE ADULT - SUBJECTIVE AND OBJECTIVE BOX
SUBJECTIVE:  HPI:  47-year-old Mandarin speaking female with no past medical history presented with sore throat for the last 4 days.  Patient states that she has had progressive odynophagia and dysphagia with mild drooling for the last 4 days that has now progressed to a hoarse voice, chills, edema to the right submandibular region.  Patient also noticed pain with range of motion of her neck.  Went to her primary care physician yesterday gave her azithromycin.  Is able to tolerate p.o. denies fever, SOB.   On admission: afebrile, hemodynamically stable, saturating well on RA, COVID + (was unaware), WBC 14.69, Hg 11.7, CT neck showed: Right tonsillar enlargement with a peritonsillar fluid collection   measuring 0.7 x 0.6 cm. Asymmetric soft tissue thickening along the right oropharynx/hypopharynx  extending towards larynx with soft tissue mass within the right laryngeal  ventricle measuring approximately 2.3 x 1.2 cm. Recommend repeat  examination after appropriate antimicrobial treatment to document  resolution. If finding persists, recommend visual inspection for further  evaluation.        PAST MEDICAL & SURGICAL HISTORY      ALLERGIES:  No Known Allergies    MEDICATIONS:  ACTIVE MEDICATIONS  acetaminophen    Suspension .. 775 milliGRAM(s) Oral every 6 hours PRN  ampicillin/sulbactam  IVPB      ampicillin/sulbactam  IVPB 3 Gram(s) IV Intermittent every 6 hours  dexAMETHasone  Injectable 6 milliGRAM(s) IV Push daily  influenza   Vaccine 0.5 milliLiter(s) IntraMuscular once  melatonin 5 milliGRAM(s) Oral at bedtime  oxycodone    5 mG/acetaminophen 325 mG 1 Tablet(s) Oral once PRN  pantoprazole    Tablet 40 milliGRAM(s) Oral before breakfast      VITALS:   T(F): 98.5  HR: 83  BP: 106/67  RR: 18  SpO2: 98%    LABS:                        12.1   9.30  )-----------( 254      ( 22 Jun 2022 07:51 )             34.5     06-22    138  |  102  |  11  ----------------------------<  133<H>  4.3   |  25  |  0.5<L>    Ca    9.0      22 Jun 2022 07:51  Phos  3.7     06-22  Mg     2.3     06-22    TPro  6.6  /  Alb  3.7  /  TBili  0.5  /  DBili  x   /  AST  20  /  ALT  16  /  AlkPhos  80  06-22        PHYSICAL EXAM:  GEN: NAD, awake and alert. No drooling or pooling of secretions. No stridor or stertor. Muffled vocal quality.  SKIN: Good color, non diaphoretic  HEENT: Oral mucosa pink and moist. No erythema or edema noted to buccal mucosa, tongue.  RESP: No dyspnea, non-labored breathing. No use of accessory muscles.  CARDIO: +S1/S2  ABDO: Soft, NT.  EXT: AO x 4    ASSESSMENT/PLAN:    Patient is a 47y old Female who presents with a chief complaint of Right PTA, Laryngeal edema (19 Jun 2022 18:24)  Admitted in ICU because of airway concern, speech and swallow was consulted and she was cleared for PO diet. ENT on board, recs to c/w dexamethasone.  Now downgraded as there is no airway concern. Currently admitted to medicine with the primary diagnosis of Peritonsillar abscess.    #Peritonsillar abscess SP needle aspiration  NO clinical nor radiological evidence of airway compromise   #COVID 19 positive  - pain control prn  - advanced to regular diet  - Decadron 6mg started,     c/w Unasyn for now.  - bedside needle aspiration performed- specimen sent for cultures.  - blood cx >ngtd     #Misc  -DVT prophylaxis: Lovenox  -GI prophylaxis: PPI  -Diet: Soft and bite sized as tolerated  -Activity: AAT  -Dispo: Acute

## 2022-06-22 NOTE — DISCHARGE NOTE PROVIDER - NSDCCPCAREPLAN_GEN_ALL_CORE_FT
PRINCIPAL DISCHARGE DIAGNOSIS  Diagnosis: Peritonsillar abscess  Assessment and Plan of Treatment: You presented with a chief complaint of pain and swelling over Rt. jaw, diffficulty and pain while swallowing for 4 days.You were found to be COVID positive but were asymptomatic.  You were initially admitted to ICU because of airway concern. Then you were downgraded to the medicine floors as there was no airway concern and chest ray was normal. Bedside needle aspiration was performed and specimen was sent for cultures.. You were treated with antibiotics, pain meds and steroids. Initially you were given soft diet. Your symptoms started to improve on medications and you tolerated the regular diet well.       - Will be discharged with PO Augmentin 875 mg q12h till 7/2 and Decadron 40mg for 3 days   - F/u with Dr. Blancas for ENT  #COVID 19 positive  -s/p vaccination and booster  -Asymptomatic. No pulmonary complaints,  -CXR insignificant  -Spo2 98% on room air.  Pt. placed on:  -DVT prophylaxis: Lovenox  -GI prophylaxis: PPI  - Activity- AAT      SECONDARY DISCHARGE DIAGNOSES  Diagnosis: Laryngeal edema  Assessment and Plan of Treatment:      PRINCIPAL DISCHARGE DIAGNOSIS  Diagnosis: Peritonsillar abscess  Assessment and Plan of Treatment: You presented with a chief complaint of pain and swelling over Rt. jaw, diffficulty and pain while swallowing for 4 days.You were found to be COVID positive but were asymptomatic.  You were initially admitted to ICU because of airway concern. Then you were downgraded to the medicine floors as there was no airway concern and chest ray was normal. Bedside needle aspiration was performed and specimen was sent for cultures.. You were treated with antibiotics, pain meds and steroids. Initially you were given soft diet. Your symptoms started to improve on medications and you tolerated the regular diet well. You will be discharge on oral augmentin till 7/2 and steroid for 3 days. F/u with Dr. Blancas.           SECONDARY DISCHARGE DIAGNOSES  Diagnosis: Laryngeal edema  Assessment and Plan of Treatment:      PRINCIPAL DISCHARGE DIAGNOSIS  Diagnosis: Peritonsillar abscess  Assessment and Plan of Treatment: You presented with a chief complaint of pain and swelling over Rt. jaw, diffficulty and pain while swallowing for 4 days.You were found to be COVID positive but were asymptomatic.  Then you were downgraded to the medicine floors as there was no airway concern and chest ray was normal. Bedside needle aspiration was performed and specimen was sent for cultures.. You were treated with antibiotics, pain meds and steroids. Initially you were given soft diet. Your symptoms started to improve on medications and you tolerated the regular diet well. You will be discharge on oral augmentin till 7/2 and steroid for 3 days. F/u with Dr. Blancas.           SECONDARY DISCHARGE DIAGNOSES  Diagnosis: Laryngeal edema  Assessment and Plan of Treatment:

## 2022-06-23 VITALS
SYSTOLIC BLOOD PRESSURE: 134 MMHG | OXYGEN SATURATION: 97 % | DIASTOLIC BLOOD PRESSURE: 82 MMHG | HEART RATE: 68 BPM | TEMPERATURE: 98 F | RESPIRATION RATE: 18 BRPM

## 2022-06-23 LAB
ALBUMIN SERPL ELPH-MCNC: 3 G/DL — LOW (ref 3.5–5.2)
ALP SERPL-CCNC: 64 U/L — SIGNIFICANT CHANGE UP (ref 30–115)
ALT FLD-CCNC: 16 U/L — SIGNIFICANT CHANGE UP (ref 0–41)
ANION GAP SERPL CALC-SCNC: 10 MMOL/L — SIGNIFICANT CHANGE UP (ref 7–14)
AST SERPL-CCNC: 12 U/L — SIGNIFICANT CHANGE UP (ref 0–41)
BASOPHILS # BLD AUTO: 0.02 K/UL — SIGNIFICANT CHANGE UP (ref 0–0.2)
BASOPHILS NFR BLD AUTO: 0.3 % — SIGNIFICANT CHANGE UP (ref 0–1)
BILIRUB SERPL-MCNC: 0.5 MG/DL — SIGNIFICANT CHANGE UP (ref 0.2–1.2)
BUN SERPL-MCNC: 15 MG/DL — SIGNIFICANT CHANGE UP (ref 10–20)
CALCIUM SERPL-MCNC: 8.6 MG/DL — SIGNIFICANT CHANGE UP (ref 8.5–10.1)
CHLORIDE SERPL-SCNC: 100 MMOL/L — SIGNIFICANT CHANGE UP (ref 98–110)
CO2 SERPL-SCNC: 28 MMOL/L — SIGNIFICANT CHANGE UP (ref 17–32)
CREAT SERPL-MCNC: 0.7 MG/DL — SIGNIFICANT CHANGE UP (ref 0.7–1.5)
EGFR: 107 ML/MIN/1.73M2 — SIGNIFICANT CHANGE UP
EOSINOPHIL # BLD AUTO: 0.01 K/UL — SIGNIFICANT CHANGE UP (ref 0–0.7)
EOSINOPHIL NFR BLD AUTO: 0.1 % — SIGNIFICANT CHANGE UP (ref 0–8)
GLUCOSE SERPL-MCNC: 95 MG/DL — SIGNIFICANT CHANGE UP (ref 70–99)
HCT VFR BLD CALC: 33.1 % — LOW (ref 37–47)
HGB BLD-MCNC: 11.4 G/DL — LOW (ref 12–16)
IMM GRANULOCYTES NFR BLD AUTO: 0.5 % — HIGH (ref 0.1–0.3)
LYMPHOCYTES # BLD AUTO: 2.84 K/UL — SIGNIFICANT CHANGE UP (ref 1.2–3.4)
LYMPHOCYTES # BLD AUTO: 37.3 % — SIGNIFICANT CHANGE UP (ref 20.5–51.1)
MAGNESIUM SERPL-MCNC: 2.1 MG/DL — SIGNIFICANT CHANGE UP (ref 1.8–2.4)
MCHC RBC-ENTMCNC: 32.4 PG — HIGH (ref 27–31)
MCHC RBC-ENTMCNC: 34.4 G/DL — SIGNIFICANT CHANGE UP (ref 32–37)
MCV RBC AUTO: 94 FL — SIGNIFICANT CHANGE UP (ref 81–99)
MONOCYTES # BLD AUTO: 0.51 K/UL — SIGNIFICANT CHANGE UP (ref 0.1–0.6)
MONOCYTES NFR BLD AUTO: 6.7 % — SIGNIFICANT CHANGE UP (ref 1.7–9.3)
NEUTROPHILS # BLD AUTO: 4.19 K/UL — SIGNIFICANT CHANGE UP (ref 1.4–6.5)
NEUTROPHILS NFR BLD AUTO: 55.1 % — SIGNIFICANT CHANGE UP (ref 42.2–75.2)
NRBC # BLD: 0 /100 WBCS — SIGNIFICANT CHANGE UP (ref 0–0)
PHOSPHATE SERPL-MCNC: 3.7 MG/DL — SIGNIFICANT CHANGE UP (ref 2.1–4.9)
PLATELET # BLD AUTO: 249 K/UL — SIGNIFICANT CHANGE UP (ref 130–400)
POTASSIUM SERPL-MCNC: 3.5 MMOL/L — SIGNIFICANT CHANGE UP (ref 3.5–5)
POTASSIUM SERPL-SCNC: 3.5 MMOL/L — SIGNIFICANT CHANGE UP (ref 3.5–5)
PROT SERPL-MCNC: 5.5 G/DL — LOW (ref 6–8)
RBC # BLD: 3.52 M/UL — LOW (ref 4.2–5.4)
RBC # FLD: 12 % — SIGNIFICANT CHANGE UP (ref 11.5–14.5)
SODIUM SERPL-SCNC: 138 MMOL/L — SIGNIFICANT CHANGE UP (ref 135–146)
WBC # BLD: 7.61 K/UL — SIGNIFICANT CHANGE UP (ref 4.8–10.8)
WBC # FLD AUTO: 7.61 K/UL — SIGNIFICANT CHANGE UP (ref 4.8–10.8)

## 2022-06-23 PROCEDURE — 99238 HOSP IP/OBS DSCHRG MGMT 30/<: CPT

## 2022-06-23 RX ADMIN — PANTOPRAZOLE SODIUM 40 MILLIGRAM(S): 20 TABLET, DELAYED RELEASE ORAL at 05:49

## 2022-06-23 RX ADMIN — AMPICILLIN SODIUM AND SULBACTAM SODIUM 200 GRAM(S): 250; 125 INJECTION, POWDER, FOR SUSPENSION INTRAMUSCULAR; INTRAVENOUS at 05:50

## 2022-06-23 RX ADMIN — AMPICILLIN SODIUM AND SULBACTAM SODIUM 200 GRAM(S): 250; 125 INJECTION, POWDER, FOR SUSPENSION INTRAMUSCULAR; INTRAVENOUS at 12:09

## 2022-06-23 RX ADMIN — Medication 6 MILLIGRAM(S): at 05:49

## 2022-06-23 NOTE — PROGRESS NOTE ADULT - SUBJECTIVE AND OBJECTIVE BOX
JOSESITO PARADA  47y  Female      Patient is a 47y old  Female who presents with a chief complaint of swelling and pain over submandibular region.      INTERVAL HPI/OVERNIGHT EVENTS:      ******************************* REVIEW OF SYSTEMS:*********************************************    All other review of systems negative    *********************** VITALS ******************************************    T(F): 97.7 (06-23-22 @ 13:20)  HR: 68 (06-23-22 @ 13:20) (68 - 77)  BP: 134/82 (06-23-22 @ 13:20) (129/73 - 134/82)  RR: 18 (06-23-22 @ 13:20) (18 - 19)  SpO2: 97% (06-23-22 @ 13:20) (97% - 100%)            ******************************** PHYSICAL EXAM:**************************************************  GENERAL: NAD    PSYCH: no agitation, baseline mentation  HEENT:     NERVOUS SYSTEM:  Alert & Oriented X3,     PULMONARY: JUNO, CTA    CARDIOVASCULAR: S1S2 RRR    GI: Soft, NT, ND; BS present.    EXTREMITIES:  2+ Peripheral Pulses, No clubbing, cyanosis, or edema    LYMPH: No lymphadenopathy noted    SKIN: No rashes or lesions      **************************** LABS *******************************************************                          11.4   7.61  )-----------( 249      ( 23 Jun 2022 05:47 )             33.1     06-23    138  |  100  |  15  ----------------------------<  95  3.5   |  28  |  0.7    Ca    8.6      23 Jun 2022 05:47  Phos  3.7     06-23  Mg     2.1     06-23    TPro  5.5<L>  /  Alb  3.0<L>  /  TBili  0.5  /  DBili  x   /  AST  12  /  ALT  16  /  AlkPhos  64  06-23          Lactate Trend        CAPILLARY BLOOD GLUCOSE              **************************Active Medications *******************************************  No Known Allergies      acetaminophen    Suspension .. 775 milliGRAM(s) Oral every 6 hours PRN  ampicillin/sulbactam  IVPB      ampicillin/sulbactam  IVPB 3 Gram(s) IV Intermittent every 6 hours  dexAMETHasone  Injectable 6 milliGRAM(s) IV Push daily  influenza   Vaccine 0.5 milliLiter(s) IntraMuscular once  melatonin 5 milliGRAM(s) Oral at bedtime  oxycodone    5 mG/acetaminophen 325 mG 1 Tablet(s) Oral once PRN  pantoprazole    Tablet 40 milliGRAM(s) Oral before breakfast      ***************************************************  RADIOLOGY & ADDITIONAL TESTS:    Imaging Personally Reviewed:  [ ] YES  [ ] NO    HEALTH ISSUES - PROBLEM Dx:

## 2022-06-23 NOTE — DISCHARGE NOTE NURSING/CASE MANAGEMENT/SOCIAL WORK - PATIENT PORTAL LINK FT
You can access the FollowMyHealth Patient Portal offered by [facility] by registering at the following website: [email or web address]. By joining [employer]’s FollowMyHealth portal, you will also be able to view your health information using other applications (apps) compatible with our system.

## 2022-06-23 NOTE — DISCHARGE NOTE NURSING/CASE MANAGEMENT/SOCIAL WORK - NSDCPEFALRISK_GEN_ALL_CORE
For information on Fall & Injury Prevention, visit: [email or web address] OR  [email or web address] OR  https://www.cdc.gov/steadi/patient.html

## 2022-06-23 NOTE — PROGRESS NOTE ADULT - ASSESSMENT
Patient is a 47y old Female who presents with a chief complaint of Right PTA, Laryngeal edema (19 Jun 2022 18:24)  Admitted in ICU because of airway concern, speech and swallow was consulted and she was cleared for PO diet. ENT on board, recs to c/w dexamethasone.  Now downgraded as there is no airway concern. Currently admitted to medicine with the primary diagnosis of Peritonsillar abscess.    #Peritonsillar abscess SP needle aspiration  NO clinical nor radiological evidence of airway compromise   #COVID 19 positive  - pain control prn  -currently on  soft diet/full liquid > Will advance as tolerated.   - Decadron 6mg started,     c/w Unasyn for now.  - bedside needle aspiration performed- specimen sent for cultures.  - blood cx >ngtd     #Misc  -DVT prophylaxis: Lovenox  -GI prophylaxis: PPI  -Diet: Soft and bite sized as tolerated  -Activity: AAT  -Dispo: Acute    #Progress Note Handoff  Pending (specify):  Clinical improvement / Wound cx   Family discussion:  Disposition: Home
Pt is a 47y Female a/w right PTA - concern for recurrent abscess    ·	Decadron 6mg now  ·	will re-aspirate at the bedside, if any aspirate collected will send to lab for culture  ·	cont IV abx, if recollected may need to consider switching abx or adding another   ·	pain control prn  ·	cont soft diet/full liquids as tolerated  ·	w/d with attng
Patient is a 47y old Female who presents with a chief complaint of Right PTA, Laryngeal edema (19 Jun 2022 18:24)  Admitted in ICU because of airway concern, speech and swallow was consulted and she was cleared for PO diet. ENT on board, recs to c/w dexamethasone.  Now downgraded as there is no airway concern. Currently admitted to medicine with the primary diagnosis of Peritonsillar abscess.    #Peritonsillar abscess SP needle aspiration  NO clinical nor radiological evidence of airway compromise   #COVID 19 positive  - pain control prn  -currently on  soft diet/full liquid > Will advance as tolerated.   - Decadron 6mg started,     on  Unasyn for now.  - bedside needle aspiration performed- specimen sent for cultures >No growth.   - blood cx >ngtd     #Misc  -DVT prophylaxis: Lovenox  -GI prophylaxis: PPI  -Diet: Soft and bite sized as tolerated  -Activity: AAT    D/C planning with PO Abx and steroid. 
Pt is a 47y Female Mandarin speaking with no significant PMH a/w Right PTA s/p bedside FNA by ENT.    Plan:   - Continue antibiotics - Unasyn  - Continue Decadron 10mg in ED, continue  Decadron 6 q 8h for 3 more doses   - Analgesics for pain control prn   - Continue IVF   - F/u PTA abscess   - F/u AM labs   - Will d/w attending 
47 year old Female Mandarin speaking with no significant PMH a/w Right PTA s/p bedside FNA x2. Patient seen and examined at bedside with Dr. Blancas this afternoon. Patient reports improvement of sore throat and states that her voice is back to baseline. Patient is also able to tolerate po and is asking when she will be discharged home.     Plan:  ·	Continue steroids, IV abx - recommend ID evaluation for outpatient abx  ·	Continue pain control prn  ·	Continue soft diet/full liquids as tolerated - advance diet as tolerated  ·	Recommend OP f/u with Dr. Blancas for further ENT management  ·	Recall prn

## 2022-06-24 LAB
CULTURE RESULTS: SIGNIFICANT CHANGE UP
CULTURE RESULTS: SIGNIFICANT CHANGE UP
SPECIMEN SOURCE: SIGNIFICANT CHANGE UP
SPECIMEN SOURCE: SIGNIFICANT CHANGE UP

## 2022-06-29 DIAGNOSIS — J36 PERITONSILLAR ABSCESS: ICD-10-CM

## 2022-06-29 DIAGNOSIS — J38.4 EDEMA OF LARYNX: ICD-10-CM

## 2022-06-29 DIAGNOSIS — U07.1 COVID-19: ICD-10-CM
